# Patient Record
Sex: MALE | URBAN - METROPOLITAN AREA
[De-identification: names, ages, dates, MRNs, and addresses within clinical notes are randomized per-mention and may not be internally consistent; named-entity substitution may affect disease eponyms.]

---

## 2019-08-12 ENCOUNTER — APPOINTMENT (OUTPATIENT)
Age: 79
Setting detail: DERMATOLOGY
End: 2019-08-13

## 2019-08-12 DIAGNOSIS — D485 NEOPLASM OF UNCERTAIN BEHAVIOR OF SKIN: ICD-10-CM

## 2019-08-12 DIAGNOSIS — D18.0 HEMANGIOMA: ICD-10-CM

## 2019-08-12 DIAGNOSIS — L57.0 ACTINIC KERATOSIS: ICD-10-CM

## 2019-08-12 PROBLEM — D48.5 NEOPLASM OF UNCERTAIN BEHAVIOR OF SKIN: Status: ACTIVE | Noted: 2019-08-12

## 2019-08-12 PROBLEM — D18.01 HEMANGIOMA OF SKIN AND SUBCUTANEOUS TISSUE: Status: ACTIVE | Noted: 2019-08-12

## 2019-08-12 PROCEDURE — 17003 DESTRUCT PREMALG LES 2-14: CPT

## 2019-08-12 PROCEDURE — OTHER REASSURANCE: OTHER

## 2019-08-12 PROCEDURE — 11312 SHAVE SKIN LESION 1.1-2.0 CM: CPT

## 2019-08-12 PROCEDURE — OTHER PREMALIGNANT DESTRUCTION: OTHER

## 2019-08-12 PROCEDURE — 11311 SHAVE SKIN LESION 0.6-1.0 CM: CPT

## 2019-08-12 PROCEDURE — 99202 OFFICE O/P NEW SF 15 MIN: CPT | Mod: 25

## 2019-08-12 PROCEDURE — OTHER COUNSELING: OTHER

## 2019-08-12 PROCEDURE — 17000 DESTRUCT PREMALG LESION: CPT | Mod: 59

## 2019-08-12 PROCEDURE — 11312 SHAVE SKIN LESION 1.1-2.0 CM: CPT | Mod: 76

## 2019-08-12 PROCEDURE — OTHER SHAVE REMOVAL: OTHER

## 2019-08-12 PROCEDURE — OTHER MIPS QUALITY: OTHER

## 2019-08-12 ASSESSMENT — LOCATION SIMPLE DESCRIPTION DERM
LOCATION SIMPLE: LEFT FOREHEAD
LOCATION SIMPLE: LEFT EAR
LOCATION SIMPLE: ABDOMEN
LOCATION SIMPLE: RIGHT FOREHEAD
LOCATION SIMPLE: CHEST
LOCATION SIMPLE: SUPERIOR FOREHEAD
LOCATION SIMPLE: RIGHT CHEEK

## 2019-08-12 ASSESSMENT — LOCATION DETAILED DESCRIPTION DERM
LOCATION DETAILED: LEFT RIB CAGE
LOCATION DETAILED: RIGHT SUPERIOR LATERAL FOREHEAD
LOCATION DETAILED: RIGHT MID PREAURICULAR CHEEK
LOCATION DETAILED: LEFT POSTERIOR EAR
LOCATION DETAILED: LEFT SUPERIOR FOREHEAD
LOCATION DETAILED: RIGHT MEDIAL INFERIOR CHEST
LOCATION DETAILED: SUPERIOR MID FOREHEAD

## 2019-08-12 ASSESSMENT — LOCATION ZONE DERM
LOCATION ZONE: TRUNK
LOCATION ZONE: FACE
LOCATION ZONE: EAR

## 2019-08-12 NOTE — PROCEDURE: SHAVE REMOVAL
X Size Of Lesion In Cm (Optional): 0
Render Path Notes In Note?: No
Medical Necessity Information: It is in your best interest to select a reason for this procedure from the list below. All of these items fulfill various CMS LCD requirements except the new and changing color options.
Post-Care Instructions: I reviewed with the patient in detail post-care instructions. Patient is to keep the biopsy site dry overnight, and then apply bacitracin twice daily until healed. Patient may apply hydrogen peroxide soaks to remove any crusting.
Detail Level: Detailed
Path Notes (To The Dermatopathologist): Please check margins.
Consent was obtained from the patient. The risks and benefits to therapy were discussed in detail. Specifically, the risks of infection, scarring, bleeding, prolonged wound healing, incomplete removal, allergy to anesthesia, nerve injury and recurrence were addressed. Prior to the procedure, the treatment site was clearly identified and confirmed by the patient. All components of Universal Protocol/PAUSE Rule completed.
Hemostasis: Electrocautery
Size Of Lesion In Cm (Required): 1.5
Biopsy Method: Dermablade
Was A Bandage Applied: Yes
Billing Type: Third-Party Bill
Notification Instructions: Patient will be notified of biopsy results. However, patient instructed to call the office if not contacted within 2 weeks.
Anesthesia Type: 1% lidocaine with epinephrine
Size Of Lesion In Cm (Required): 1.3
Wound Care: Petrolatum
Size Of Lesion In Cm (Required): 0.8

## 2019-08-12 NOTE — PROCEDURE: PREMALIGNANT DESTRUCTION
Render Note In Bullet Format When Appropriate: No
Anesthesia Volume In Cc: 0.5
Post-Care Instructions: I reviewed with the patient in detail post-care instructions. Patient is to wear sunprotection, and avoid picking at any of the treated lesions. Pt may apply Vaseline to crusted or scabbing areas.
Method: electrodesiccation
Post-Procedure Care (Optional): The wound was cleaned, and a pressure dressing was applied.  The patient received detailed post-op instructions.
Detail Level: Detailed
Consent: The patient's consent was obtained including but not limited to risks of crusting, scabbing, blistering, scarring, darker or lighter pigmentary change, recurrence, incomplete removal and infection.

## 2019-08-12 NOTE — PROCEDURE: MIPS QUALITY
Quality 130: Documentation Of Current Medications In The Medical Record: Current Medications Documented
Quality 131: Pain Assessment And Follow-Up: Pain assessment using a standardized tool is documented as negative, no follow-up plan required
Quality 226: Preventive Care And Screening: Tobacco Use: Screening And Cessation Intervention: Patient screened for tobacco use and is an ex/non-smoker
Quality 137: Melanoma: Continuity Of Care - Recall System: Recall system not utilized, reason not otherwise specified
Detail Level: Detailed
Quality 431: Preventive Care And Screening: Unhealthy Alcohol Use - Screening: Patient screened for unhealthy alcohol use using a single question and scores less than 2 times per year
Quality 138: Melanoma: Coordination Of Care: Treatment plan not communicated, reason not otherwise specified.
Quality 47: Advance Care Plan: Advance Care Planning discussed and documented; advance care plan or surrogate decision maker documented in the medical record.

## 2022-11-22 ENCOUNTER — APPOINTMENT (OUTPATIENT)
Dept: URBAN - METROPOLITAN AREA CLINIC 276 | Age: 82
Setting detail: DERMATOLOGY
End: 2022-11-29

## 2022-11-22 DIAGNOSIS — L57.0 ACTINIC KERATOSIS: ICD-10-CM

## 2022-11-22 DIAGNOSIS — D22 MELANOCYTIC NEVI: ICD-10-CM

## 2022-11-22 DIAGNOSIS — L81.4 OTHER MELANIN HYPERPIGMENTATION: ICD-10-CM

## 2022-11-22 DIAGNOSIS — Z85.828 PERSONAL HISTORY OF OTHER MALIGNANT NEOPLASM OF SKIN: ICD-10-CM

## 2022-11-22 DIAGNOSIS — L82.1 OTHER SEBORRHEIC KERATOSIS: ICD-10-CM

## 2022-11-22 PROBLEM — D22.5 MELANOCYTIC NEVI OF TRUNK: Status: ACTIVE | Noted: 2022-11-22

## 2022-11-22 PROCEDURE — 17000 DESTRUCT PREMALG LESION: CPT

## 2022-11-22 PROCEDURE — OTHER MIPS QUALITY: OTHER

## 2022-11-22 PROCEDURE — OTHER COUNSELING: OTHER

## 2022-11-22 PROCEDURE — OTHER SUNSCREEN RECOMMENDATIONS: OTHER

## 2022-11-22 PROCEDURE — OTHER LIQUID NITROGEN: OTHER

## 2022-11-22 PROCEDURE — 99203 OFFICE O/P NEW LOW 30 MIN: CPT | Mod: 25

## 2022-11-22 ASSESSMENT — LOCATION ZONE DERM
LOCATION ZONE: FACE
LOCATION ZONE: ARM
LOCATION ZONE: TRUNK

## 2022-11-22 ASSESSMENT — LOCATION DETAILED DESCRIPTION DERM
LOCATION DETAILED: LEFT MID-UPPER BACK
LOCATION DETAILED: LEFT SUPERIOR UPPER BACK
LOCATION DETAILED: RIGHT SUPERIOR LATERAL FOREHEAD
LOCATION DETAILED: RIGHT POSTERIOR SHOULDER
LOCATION DETAILED: RIGHT SUPERIOR UPPER BACK

## 2022-11-22 ASSESSMENT — LOCATION SIMPLE DESCRIPTION DERM
LOCATION SIMPLE: RIGHT FOREHEAD
LOCATION SIMPLE: RIGHT UPPER BACK
LOCATION SIMPLE: LEFT UPPER BACK
LOCATION SIMPLE: RIGHT SHOULDER

## 2022-11-22 NOTE — PROCEDURE: LIQUID NITROGEN
Render Note In Bullet Format When Appropriate: No
Number Of Freeze-Thaw Cycles: 2 freeze-thaw cycles
Render Post-Care Instructions In Note?: yes
Post-Care Instructions: I reviewed with the patient in detail post-care instructions. Patient is to wear sunprotection, and avoid picking at any of the treated lesions. Pt may apply Vaseline to crusted or scabbing areas.
Detail Level: Simple
Consent: The patient's consent was obtained including but not limited to risks of crusting, scabbing, blistering, scarring, darker or lighter pigmentary change, recurrence, incomplete removal and infection.

## 2022-12-11 ENCOUNTER — APPOINTMENT (OUTPATIENT)
Dept: GENERAL RADIOLOGY | Age: 82
End: 2022-12-11
Attending: EMERGENCY MEDICINE
Payer: MEDICARE

## 2022-12-11 ENCOUNTER — HOSPITAL ENCOUNTER (EMERGENCY)
Age: 82
Discharge: HOME | End: 2022-12-11
Attending: EMERGENCY MEDICINE
Payer: MEDICARE

## 2022-12-11 VITALS
SYSTOLIC BLOOD PRESSURE: 131 MMHG | TEMPERATURE: 98.1 F | DIASTOLIC BLOOD PRESSURE: 69 MMHG | RESPIRATION RATE: 16 BRPM | HEART RATE: 61 BPM | OXYGEN SATURATION: 98 %

## 2022-12-11 DIAGNOSIS — M25.552 ACUTE HIP PAIN, LEFT: Primary | ICD-10-CM

## 2022-12-11 PROCEDURE — 73502 X-RAY EXAM HIP UNI 2-3 VIEWS: CPT

## 2022-12-11 PROCEDURE — 99283 EMERGENCY DEPT VISIT LOW MDM: CPT

## 2022-12-11 RX ORDER — ACETAMINOPHEN 500 MG
1000 TABLET ORAL
Qty: 20 TABLET | Refills: 0 | Status: SHIPPED | OUTPATIENT
Start: 2022-12-11

## 2022-12-11 RX ORDER — DICLOFENAC SODIUM 10 MG/G
4 GEL TOPICAL 4 TIMES DAILY
Qty: 100 G | Refills: 0 | Status: SHIPPED | OUTPATIENT
Start: 2022-12-11

## 2022-12-11 RX ORDER — LIDOCAINE 4 G/100G
PATCH TOPICAL
Qty: 20 PATCH | Refills: 0 | Status: SHIPPED | OUTPATIENT
Start: 2022-12-11

## 2022-12-11 NOTE — ED NOTES
MD reviewed discharge instructions and options with patient and patient verbalized understanding. RN reviewed discharge instructions using teachback method. Pt ambulated to exit without difficulty and in no signs of acute distress escorted by his daughter, and she  will drive home. No complaints or needs expressed at this time. Patient was counseled on medications prescribed at discharge. VSS, verbalized relief from most intense pain. Patient to call his PCP in the morning for appointment.

## 2022-12-11 NOTE — ED TRIAGE NOTES
Pt arrives with cc of left hip pain that has been going on since last night. Pt normally lives at a memory care facility but the daughter pulled him out of it for safety concerns. Pt sustained fall on right hip on 11/28 and had normal xrays. Daughter is concerned he might have had another fall that the nursing facility did not report.

## 2022-12-16 NOTE — ED PROVIDER NOTES
The history is provided by a relative. Hip Pain   This is a new problem. The current episode started yesterday. The problem occurs constantly. The problem has not changed since onset. The pain is present in the left upper leg and left hip. The pain is moderate. Pertinent negatives include full range of motion. The symptoms are aggravated by movement and standing. He has tried nothing for the symptoms. There has been no history of extremity trauma. No past medical history on file. No past surgical history on file. No family history on file. Social History     Socioeconomic History    Marital status:      Spouse name: Not on file    Number of children: Not on file    Years of education: Not on file    Highest education level: Not on file   Occupational History    Not on file   Tobacco Use    Smoking status: Not on file    Smokeless tobacco: Not on file   Substance and Sexual Activity    Alcohol use: Not on file    Drug use: Not on file    Sexual activity: Not on file   Other Topics Concern    Not on file   Social History Narrative    Not on file     Social Determinants of Health     Financial Resource Strain: Not on file   Food Insecurity: Not on file   Transportation Needs: Not on file   Physical Activity: Not on file   Stress: Not on file   Social Connections: Not on file   Intimate Partner Violence: Not on file   Housing Stability: Not on file         ALLERGIES: Patient has no known allergies. Review of Systems   All other systems reviewed and are negative. Vitals:    12/11/22 0533   BP: 131/69   Pulse: 61   Resp: 16   Temp: 98.1 °F (36.7 °C)   SpO2: 98%            Physical Exam  Vitals and nursing note reviewed. Constitutional:       General: He is not in acute distress. Appearance: He is well-developed. HENT:      Head: Normocephalic and atraumatic. Eyes:      Conjunctiva/sclera: Conjunctivae normal.   Neck:      Trachea: No tracheal deviation.    Cardiovascular:      Rate and Rhythm: Normal rate and regular rhythm. Pulmonary:      Effort: Pulmonary effort is normal. No respiratory distress. Abdominal:      General: There is no distension. Musculoskeletal:         General: No tenderness, deformity or signs of injury. Normal range of motion. Cervical back: Neck supple. Right lower leg: No edema. Left lower leg: No edema. Skin:     General: Skin is warm and dry. Neurological:      Mental Status: He is alert. Cranial Nerves: No cranial nerve deficit. Psychiatric:         Behavior: Behavior normal.        MDM       [de-identified] y.o. male presents with atraumatic left hip pain, noted by daughter who recently brought him home from memory care unit and he has been complaining of it. No signs of injury. Plan film negative. Provided instructions for supportive care measures. Plan to follow up with PCP as needed and return precautions discussed for worsening or new concerning symptoms.      Procedures

## 2023-01-02 ENCOUNTER — APPOINTMENT (OUTPATIENT)
Dept: CT IMAGING | Age: 83
End: 2023-01-02
Attending: EMERGENCY MEDICINE
Payer: MEDICARE

## 2023-01-02 ENCOUNTER — HOSPITAL ENCOUNTER (EMERGENCY)
Age: 83
Discharge: HOME | End: 2023-01-02
Attending: EMERGENCY MEDICINE
Payer: MEDICARE

## 2023-01-02 VITALS
BODY MASS INDEX: 26.04 KG/M2 | WEIGHT: 181.88 LBS | HEART RATE: 64 BPM | TEMPERATURE: 97.8 F | DIASTOLIC BLOOD PRESSURE: 57 MMHG | OXYGEN SATURATION: 98 % | SYSTOLIC BLOOD PRESSURE: 96 MMHG | RESPIRATION RATE: 16 BRPM | HEIGHT: 70 IN

## 2023-01-02 DIAGNOSIS — T07.XXXA ABRASIONS OF MULTIPLE SITES: ICD-10-CM

## 2023-01-02 DIAGNOSIS — S09.90XA INJURY OF HEAD, INITIAL ENCOUNTER: Primary | ICD-10-CM

## 2023-01-02 PROCEDURE — 99284 EMERGENCY DEPT VISIT MOD MDM: CPT

## 2023-01-02 PROCEDURE — 70450 CT HEAD/BRAIN W/O DYE: CPT

## 2023-01-02 NOTE — ED TRIAGE NOTES
Patient had fall yesterday at the facility. Per care takes patient was aggressive, grabbed on the t-shirt, pulled and fall. Patient had physical therapy today, reported dizziness, marks on his head and hand/back, knee noted. Patient is ambulatory in triage. Patient reports left foot toe pain, left/right ear pain. Patient had Hx of Alzheimer disease, takes blood thinner medication. Patient is A/O to self at baseline.

## 2023-01-02 NOTE — ED NOTES
Discharge instructions reviewed with pt and copy given along with RX by this RN. Patient educated on follow up with PCP and is ambulatory from ED in no sign of distress or discomfort accompanied by daughter who is providing transport home.

## 2023-01-02 NOTE — ED PROVIDER NOTES
27-year-old male with a past medical history significant for Alzheimer dementia and chronic anticoagulation with Eliquis who presents to the ER for evaluation after a ground-level fall 2 days ago with a complaint injury malaise and dizziness. The patient sustained multiple superficial abrasions all over his body and ambulate with assistance. Daughter states that the patient was about to undergo physical therapy when he stated that he was not feeling well and was instructed to go to the ER for further evaluation. Past Medical History:   Diagnosis Date    Alzheimer disease ([facility] [address]     Dementia ([facility] [address]        No past surgical history on file. No family history on file. Social History     Socioeconomic History    Marital status:      Spouse name: Not on file    Number of children: Not on file    Years of education: Not on file    Highest education level: Not on file   Occupational History    Not on file   Tobacco Use    Smoking status: Not on file    Smokeless tobacco: Not on file   Substance and Sexual Activity    Alcohol use: Not on file    Drug use: Not on file    Sexual activity: Not on file   Other Topics Concern    Not on file   Social History Narrative    Not on file     Social Determinants of Health     Financial Resource Strain: Not on file   Food Insecurity: Not on file   Transportation Needs: Not on file   Physical Activity: Not on file   Stress: Not on file   Social Connections: Not on file   Intimate Partner Violence: Not on file   Housing Stability: Not on file         ALLERGIES: Patient has no known allergies. Review of Systems   All other systems reviewed and are negative. Vitals:    01/02/23 1208 01/02/23 1214   BP: (!) 96/57    Pulse: 64    Resp: 16    Temp: 97.8 °F (36.6 °C)    SpO2: 98% 98%   Weight: 82.5 kg (181 lb 14.1 oz)    Height: 5' 10\" (1.778 m)             Physical Exam  Vitals and nursing note reviewed. Exam conducted with a chaperone present.         CONSTITUTIONAL: Frail elderly male; in no apparent distress  HEAD: Normocephalic; atraumatic  EYES: PERRL; EOM intact; conjunctiva and sclera are clear bilaterally. ENT: No rhinorrhea; normal pharynx with no tonsillar hypertrophy; mucous membranes pink/moist, no erythema, no exudate. NECK: Supple; non-tender; no cervical lymphadenopathy  CARD: Normal S1, S2; no murmurs, rubs, or gallops. Regular rate and rhythm. RESP: Normal respiratory effort; breath sounds clear and equal bilaterally; no wheezes, rhonchi, or rales. ABD: Normal bowel sounds; non-distended; non-tender; no palpable organomegaly, no masses, no bruits. Back Exam: Normal inspection; no vertebral point tenderness, no CVA tenderness. Normal range of motion. EXT: Normal ROM in all four extremities; non-tender to palpation; no swelling or deformity; distal pulses are normal, no edema. SKIN: Warm; dry; multiple superficial abrasions to the forehead, right shoulder, right hand and knee  NEURO:Alert and oriented x 3, coherent, SANTY-XII grossly intact, sensory and motor are non-focal.        MDM  Number of Diagnoses or Management Options  Abrasions of multiple sites  Injury of head, initial encounter  Diagnosis management comments: Assessment: 29-year-old male with history of chronic anticoagulation and dementia who had a unwitnessed ground-level fall due to gait instability presents to the ER for evaluation for same. He remained hemodynamically stable with a nonfocal exam.  Patient is a very limited historian as well as the history was obtained from the daughter at the bedside. The patient multiple superficial injuries but will need evaluation for ICH because of symptom and dizziness and unsteady gait. Plan: CT scan of the head/education, reassurance, symptomatic treatment/serial exam/ Monitor and Reevaluate.          Amount and/or Complexity of Data Reviewed  Clinical lab tests: ordered and reviewed  Tests in the radiology section of CPT®: ordered and reviewed  Tests in the medicine section of CPT®: reviewed and ordered  Discussion of test results with the performing providers: yes  Decide to obtain previous medical records or to obtain history from someone other than the patient: yes  Obtain history from someone other than the patient: yes  Review and summarize past medical records: yes  Discuss the patient with other providers: yes  Independent visualization of images, tracings, or specimens: yes           Procedures    Progress Note:   Pt has been reexamined by Stuart Guy MD. Pt is feeling much better. Symptoms have improved. All available results have been reviewed with pt and any available family. Pt understands sx, dx, and tx in ED. Care plan has been outlined and questions have been answered. Pt is ready to go home. Will send home on Head injury/ abrasions iunstructions. Outpatient referral with PCP as needed. Written by Stuart Guy MD,12:59 PM    .   .

## 2023-01-31 ENCOUNTER — APPOINTMENT (OUTPATIENT)
Dept: CT IMAGING | Age: 83
End: 2023-01-31
Attending: EMERGENCY MEDICINE
Payer: MEDICARE

## 2023-01-31 ENCOUNTER — HOSPITAL ENCOUNTER (EMERGENCY)
Age: 83
Discharge: HOME | End: 2023-01-31
Attending: EMERGENCY MEDICINE
Payer: MEDICARE

## 2023-01-31 VITALS
SYSTOLIC BLOOD PRESSURE: 132 MMHG | HEART RATE: 76 BPM | RESPIRATION RATE: 16 BRPM | TEMPERATURE: 97.8 F | DIASTOLIC BLOOD PRESSURE: 71 MMHG | BODY MASS INDEX: 26.57 KG/M2 | WEIGHT: 185.63 LBS | HEIGHT: 70 IN | OXYGEN SATURATION: 99 %

## 2023-01-31 DIAGNOSIS — S00.81XA FACIAL ABRASION, INITIAL ENCOUNTER: Primary | ICD-10-CM

## 2023-01-31 PROCEDURE — 72125 CT NECK SPINE W/O DYE: CPT

## 2023-01-31 PROCEDURE — 99284 EMERGENCY DEPT VISIT MOD MDM: CPT

## 2023-01-31 PROCEDURE — 70486 CT MAXILLOFACIAL W/O DYE: CPT

## 2023-01-31 PROCEDURE — 70450 CT HEAD/BRAIN W/O DYE: CPT

## 2023-01-31 RX ORDER — MAG HYDROX/ALUMINUM HYD/SIMETH 400-400-40
SUSPENSION, ORAL (FINAL DOSE FORM) ORAL
COMMUNITY

## 2023-01-31 RX ORDER — ATORVASTATIN CALCIUM 10 MG/1
TABLET, FILM COATED ORAL DAILY
COMMUNITY

## 2023-01-31 RX ORDER — DONEPEZIL HYDROCHLORIDE 5 MG/1
TABLET, FILM COATED ORAL
COMMUNITY

## 2023-01-31 RX ORDER — LISINOPRIL 10 MG/1
TABLET ORAL DAILY
COMMUNITY

## 2023-01-31 RX ORDER — IBUPROFEN 800 MG/1
TABLET ORAL
COMMUNITY

## 2023-01-31 RX ORDER — QUETIAPINE FUMARATE 50 MG/1
50 TABLET, FILM COATED ORAL 2 TIMES DAILY
COMMUNITY

## 2023-01-31 RX ORDER — METOPROLOL SUCCINATE 25 MG/1
TABLET, EXTENDED RELEASE ORAL DAILY
COMMUNITY

## 2023-01-31 RX ORDER — FUROSEMIDE 40 MG/1
TABLET ORAL DAILY
COMMUNITY

## 2023-01-31 NOTE — ED NOTES
I have reviewed discharge instructions with the daughter. The daughter verbalized understanding. Copy of discharge instructions provided.  List of [facility] primary care providers given to daughter per her request.

## 2023-01-31 NOTE — DISCHARGE INSTRUCTIONS
Thank you for allowing us to provide you with medical care today. We realize that you have many choices for your emergency care needs. We thank you for choosing [facility]. Please choose us in the future for any continued health care needs. The exam and treatment you received in the Emergency Department were for an emergent problem and are not intended as complete care. It is important that you follow up with a doctor, nurse practitioner, or physician's assistant for ongoing care. If your symptoms worsen or you do not improve as expected and you are unable to reach your usual health care provider, you should return to the Emergency Department. We are available 24 hours a day. Please make an appointment with your health care provider(s) for follow up of your Emergency Department visit. Take this sheet with you when you go to your follow-up visit.

## 2023-01-31 NOTE — ED PROVIDER NOTES
82-year-old male with a history of dementia presents with a chief complaint of a fall. Patient's family member reports that she received a phone call this morning stating that the patient had fallen and broken his glasses. He did suffer some abrasions to his face. The fall was unwitnessed. Patient complains of no pain. Past Medical History:   Diagnosis Date    Alzheimer disease ([facility] [address]     Dementia ([facility] [address]        No past surgical history on file. No family history on file. Social History     Socioeconomic History    Marital status:      Spouse name: Not on file    Number of children: Not on file    Years of education: Not on file    Highest education level: Not on file   Occupational History    Not on file   Tobacco Use    Smoking status: Not on file    Smokeless tobacco: Not on file   Substance and Sexual Activity    Alcohol use: Not on file    Drug use: Not on file    Sexual activity: Not on file   Other Topics Concern    Not on file   Social History Narrative    Not on file     Social Determinants of Health     Financial Resource Strain: Not on file   Food Insecurity: Not on file   Transportation Needs: Not on file   Physical Activity: Not on file   Stress: Not on file   Social Connections: Not on file   Intimate Partner Violence: Not on file   Housing Stability: Not on file         ALLERGIES: Patient has no known allergies. Review of Systems   Unable to perform ROS: Dementia     There were no vitals filed for this visit. Physical Exam  Vitals and nursing note reviewed. Constitutional:       General: He is not in acute distress. Appearance: Normal appearance. He is not ill-appearing, toxic-appearing or diaphoretic. HENT:      Head: Normocephalic. Comments: Minor superficial abrasions to the face. Eyes:      Extraocular Movements: Extraocular movements intact. Cardiovascular:      Rate and Rhythm: Normal rate.    Pulmonary:      Effort: Pulmonary effort is normal. No respiratory distress. Abdominal:      General: There is no distension. Musculoskeletal:         General: Normal range of motion. Cervical back: Normal range of motion. Skin:     General: Skin is dry. Capillary Refill: Capillary refill takes less than 2 seconds. Neurological:      General: No focal deficit present. Mental Status: He is alert. Mental status is at baseline. Psychiatric:         Mood and Affect: Mood normal.        Medical Decision Making      Patient presents after a fall. He does have some minor abrasions to the face. CT of the head, C-spine and max face obtained to rule out traumatic injury. I have independently reviewed the obtained radiographic images and note no intracranial hemorrhage on CT head. Will await formal radiology read. Radiology confirms no traumatic injury on CT head, C-spine and max face. Discussed my clinical impression(s), any labs and/or radiology results with the patient's family member. I answered any questions and addressed any concerns. Discussed the importance of following up with their primary care physician and/or specialist(s). Discussed signs or symptoms that would warrant return back to the ER for further evaluation. The patient's family member is agreeable with discharge. Amount and/or Complexity of Data Reviewed  Radiology: ordered.            Procedures

## 2023-01-31 NOTE — ED TRIAGE NOTES
Daughter reports that patient is a resident at [facility] and staff reported that noticed patient fell at unknown time last night.  Pt has dried blood and abrasion noted to L side of face and broken glasses when daughter came to see patient this am.

## 2023-03-23 ENCOUNTER — OFFICE VISIT (OUTPATIENT)
Dept: PRIMARY CARE CLINIC | Age: 83
End: 2023-03-23

## 2023-03-23 VITALS
WEIGHT: 168 LBS | HEIGHT: 70 IN | TEMPERATURE: 97.8 F | DIASTOLIC BLOOD PRESSURE: 74 MMHG | OXYGEN SATURATION: 99 % | HEART RATE: 55 BPM | RESPIRATION RATE: 18 BRPM | SYSTOLIC BLOOD PRESSURE: 111 MMHG | BODY MASS INDEX: 24.05 KG/M2

## 2023-03-23 DIAGNOSIS — E55.9 VITAMIN D DEFICIENCY: ICD-10-CM

## 2023-03-23 DIAGNOSIS — R63.4 WEIGHT LOSS: ICD-10-CM

## 2023-03-23 DIAGNOSIS — I10 PRIMARY HYPERTENSION: Primary | ICD-10-CM

## 2023-03-23 DIAGNOSIS — E83.42 HYPOMAGNESEMIA: ICD-10-CM

## 2023-03-23 DIAGNOSIS — E78.00 HYPERCHOLESTEREMIA: ICD-10-CM

## 2023-03-23 DIAGNOSIS — I48.91 ATRIAL FIBRILLATION, UNSPECIFIED TYPE (HCC): ICD-10-CM

## 2023-03-23 DIAGNOSIS — G25.81 RESTLESS LEG SYNDROME: ICD-10-CM

## 2023-03-23 DIAGNOSIS — F03.911 DEMENTIA WITH AGITATION, UNSPECIFIED DEMENTIA SEVERITY, UNSPECIFIED DEMENTIA TYPE (HCC): ICD-10-CM

## 2023-03-23 DIAGNOSIS — Z97.4 HEARING AID WORN: ICD-10-CM

## 2023-03-23 DIAGNOSIS — R30.0 DYSURIA: ICD-10-CM

## 2023-03-23 DIAGNOSIS — C44.90 SKIN CANCER: ICD-10-CM

## 2023-03-23 DIAGNOSIS — E53.8 B12 DEFICIENCY: ICD-10-CM

## 2023-03-23 NOTE — PROGRESS NOTES
Chief Complaint   Patient presents with    \[facility]\"" Care     Lives in memory care  Dementia care       There were no vitals taken for this visit. 1. Have you been to the ER, urgent care clinic since your last visit? Hospitalized since your last visit? No    2. Have you seen or consulted any other health care providers outside of the [address] [place] [address] since your last visit? Include any pap smears or colon screening. No      3. For patients aged 39-70: Has the patient had a colonoscopy / FIT/ Cologuard?  Yes - no Care Gap present

## 2023-03-23 NOTE — PROGRESS NOTES
Pretty Bach is a 82 y.o.  male and presents with     Chief Complaint   Patient presents with    Establish Care     Lives in memory [facility]  Dementia care     Pt is here to establish care. Accompanied by daughter and son  Pt lives in Memory care. Was seeing Neurologist in [address] [place] [address]. HAS appt with Dr Tana North . Pt has h/o agitation in the past.  There is a physician at the memory [facility]. Daughter also contacted Hospice. Pt has h/o insomnia  Has restless leg syndrome. Daughter wants to try Gabapentin . Pt has neuropathy . Was diagnosed with Dementia in 2006. Initially was diagnosed with vascualr dementia  Dr Bren Modi year thought she could have Lewy body as he was shalng   Forgets to eat , swallow  Has lost 17 pounds since January 31st.  Daughter met with Psych. Pt is on Seroquel. Dose was increased - had side effects. HAs extended release. Daughter lives 2 miles away. Epson salt helps him calm down. Magnesium supplement seems to help'  Lthea 9 seems to help with anxiety. Pt  falls. Has appt with Dermtaology on forehead. Dr Parish Blackman prescribes the meds. Pt has afib. Pt was admitted last year to [facility]. Past Medical History:   Diagnosis Date    Alzheimer disease ([facility]     Dementia ([facility]      No past surgical history on file. Current Outpatient Medications   Medication Sig    atorvastatin (LIPITOR) 10 mg tablet Take  by mouth daily. donepeziL (ARICEPT) 5 mg tablet Take  by mouth nightly. lisinopriL (PRINIVIL, ZESTRIL) 10 mg tablet Take  by mouth daily. metoprolol succinate (TOPROL-XL) 25 mg XL tablet Take  by mouth daily. QUEtiapine (SEROquel) 50 mg tablet Take 50 mg by mouth two (2) times a day. saw palmetto 450 mg cap Take  by mouth.    rivaroxaban (XARELTO) 20 mg tab tablet Take  by mouth daily. ibuprofen (MOTRIN) 800 mg tablet Take  by mouth.     acetaminophen (TYLENOL) 500 mg tablet Take 2 Tablets by mouth every six (6) hours as needed for Pain or Fever. lidocaine (Lido Rafael) 4 % patch Apply as needed every 12 hours for pain. diclofenac (Voltaren) 1 % gel Apply 4 g to affected area four (4) times daily. No current facility-administered medications for this visit. Health Maintenance   Topic Date Due    COVID-19 Vaccine (1) Never done    Lipid Screen  Never done    DTaP/Tdap/Td series (1 - Tdap) Never done    Shingles Vaccine (1 of 2) Never done    Pneumococcal 65+ years (1 - PCV) Never done    Flu Vaccine (1) Never done    Medicare Yearly Exam  Never done    Depression Screen  03/23/2024       There is no immunization history on file for this patient. No LMP for male patient. Allergies and Intolerances:   No Known Allergies    Family History:   No family history on file. Social History:   He  reports that he has never smoked. He has never used smokeless tobacco.  He  reports that he does not currently use alcohol.             Review of Systems:   General: negative for - chills, fatigue, fever, weight change  Psych: negative for - anxiety, depression, irritability or mood swings  ENT: negative for - headaches, hearing change, nasal congestion, oral lesions, sneezing or sore throat  Heme/ Lymph: negative for - bleeding problems, bruising, pallor or swollen lymph nodes  Endo: negative for - hot flashes, polydipsia/polyuria or temperature intolerance  Resp: negative for - cough, shortness of breath or wheezing  CV: negative for - chest pain, edema or palpitations  GI: negative for - abdominal pain, change in bowel habits, constipation, diarrhea or nausea/vomiting  : negative for - dysuria, hematuria, incontinence, pelvic pain or vulvar/vaginal symptoms  MSK: negative for - joint pain, joint swelling or muscle pain  Neuro: negative for - confusion, headaches, seizures or weakness  Derm: negative for - dry skin, hair changes, rash or skin lesion changes          Physical:   Vitals:   Vitals:    03/23/23 1304   BP: 111/74   Pulse: (!) 55 Resp: 18   Temp: 97.8 °F (36.6 °C)   TempSrc: Temporal   SpO2: 99%   Weight: 168 lb (76.2 kg)   Height: 5' 10\" (1.778 m)           Exam:   HEENT- atraumatic,normocephalic, awake, oriented, well nourished  Neck - supple,no enlarged lymph nodes, no JVD, no thyromegaly  Chest- CTA, no rhonchi, no crackles  Heart- rrr, no murmurs / gallop/rub  Abdomen- soft,BS+,NT, no hepatosplenomegaly  Ext - no c/c/edema   Neuro- no focal deficits. Power 5/5 all extremities, patient has severe dementia, was unable to give any history, or answer any questions,  Skin - warm,dry, no obvious rashes. Review of Data:   LABS:   No results found for: WBC, HGB, HCT, PLT, HGBEXT, HCTEXT, PLTEXT, HGBEXT, HCTEXT, PLTEXT  No results found for: NA, K, CL, CO2, GLU, BUN, CREA  No results found for: CHOL, CHOLX, CHLST, CHOLV, HDL, HDLP, LDL, LDLC, DLDLP, TGLX, TRIGL, TRIGP  No components found for: GPT        Impression / Plan:        ICD-10-CM ICD-9-CM    1. Primary hypertension  I10 401.9 CBC WITH AUTOMATED DIFF      METABOLIC PANEL, COMPREHENSIVE      TSH 3RD GENERATION      URINALYSIS W/ RFLX MICROSCOPIC      2. Hypercholesteremia  H94.55 578.6 METABOLIC PANEL, COMPREHENSIVE      3. Dementia with agitation, unspecified dementia severity, unspecified dementia type  F03.911 294.21       4. Skin cancer  C44.90 173.90       5. Atrial fibrillation, unspecified type ([facility]  I48.91 427.31       6. Restless leg syndrome  G25.81 333.94       7. Weight loss  R63.4 783.21       8. B12 deficiency  E53.8 266.2 VITAMIN B12 & FOLATE      9. Vitamin D deficiency  E55.9 268.9 VITAMIN D, 25 HYDROXY      10. Hearing aid worn  Z97.4 V45.89       11. Hypomagnesemia  E83.42 275.2 MAGNESIUM      12. Dysuria  R30.0 788. 1 CULTURE, URINE        Spent over 45 minutes with patient and family members. Addressed concern regarding agitation as well as hospice care      Explained to patient risk benefits of the medications. Advised patient to stop meds if having any side effects. Pt verbalized understanding of the instructions. I have discussed the diagnosis with the patient and the intended plan as seen in the above orders. The patient has received an after-visit summary and questions were answered concerning future plans. I have discussed medication side effects and warnings with the patient as well. I have reviewed the plan of care with the patient, accepted their input and they are in agreement with the treatment goals. Reviewed plan of care. Patient has provided input and agrees with goals. Follow-up and Dispositions    Return in about 3 months (around 6/23/2023).          Kavon Moe MD

## 2023-03-26 ENCOUNTER — APPOINTMENT (OUTPATIENT)
Dept: CT IMAGING | Age: 83
DRG: 057 | End: 2023-03-26
Attending: STUDENT IN AN ORGANIZED HEALTH CARE EDUCATION/TRAINING PROGRAM
Payer: MEDICARE

## 2023-03-26 ENCOUNTER — APPOINTMENT (OUTPATIENT)
Dept: GENERAL RADIOLOGY | Age: 83
DRG: 057 | End: 2023-03-26
Attending: STUDENT IN AN ORGANIZED HEALTH CARE EDUCATION/TRAINING PROGRAM
Payer: MEDICARE

## 2023-03-26 ENCOUNTER — HOSPITAL ENCOUNTER (INPATIENT)
Age: 83
LOS: 1 days | Discharge: NURSING, REHAB OR LONG-TERM CARE | DRG: 057 | End: 2023-03-27
Attending: STUDENT IN AN ORGANIZED HEALTH CARE EDUCATION/TRAINING PROGRAM | Admitting: FAMILY MEDICINE
Payer: MEDICARE

## 2023-03-26 DIAGNOSIS — R45.1 AGITATION: ICD-10-CM

## 2023-03-26 DIAGNOSIS — E55.9 VITAMIN D DEFICIENCY: Primary | ICD-10-CM

## 2023-03-26 DIAGNOSIS — R07.9 CHEST PAIN, UNSPECIFIED TYPE: Primary | ICD-10-CM

## 2023-03-26 DIAGNOSIS — R00.1 BRADYCARDIA: ICD-10-CM

## 2023-03-26 DIAGNOSIS — R41.0 CONFUSION: ICD-10-CM

## 2023-03-26 LAB
ALBUMIN SERPL-MCNC: 3.9 G/DL (ref 3.5–5)
ALBUMIN/GLOB SERPL: 1.3 (ref 1.1–2.2)
ALP SERPL-CCNC: 107 U/L (ref 45–117)
ALT SERPL-CCNC: 24 U/L (ref 12–78)
ANION GAP SERPL CALC-SCNC: 8 MMOL/L (ref 5–15)
AST SERPL-CCNC: 17 U/L (ref 15–37)
BASOPHILS # BLD: 0.1 K/UL (ref 0–0.1)
BASOPHILS NFR BLD: 1 % (ref 0–1)
BILIRUB SERPL-MCNC: 1.1 MG/DL (ref 0.2–1)
BNP SERPL-MCNC: 173 PG/ML (ref 0–450)
BUN SERPL-MCNC: 24 MG/DL (ref 6–20)
BUN/CREAT SERPL: 29 (ref 12–20)
CALCIUM SERPL-MCNC: 8.8 MG/DL (ref 8.5–10.1)
CHLORIDE SERPL-SCNC: 106 MMOL/L (ref 97–108)
CO2 SERPL-SCNC: 31 MMOL/L (ref 21–32)
CREAT SERPL-MCNC: 0.84 MG/DL (ref 0.7–1.3)
DIFFERENTIAL METHOD BLD: NORMAL
EOSINOPHIL # BLD: 0.3 K/UL (ref 0–0.4)
EOSINOPHIL NFR BLD: 5 % (ref 0–7)
ERYTHROCYTE [DISTWIDTH] IN BLOOD BY AUTOMATED COUNT: 12.1 % (ref 11.5–14.5)
GLOBULIN SER CALC-MCNC: 3 G/DL (ref 2–4)
GLUCOSE SERPL-MCNC: 88 MG/DL (ref 65–100)
HCT VFR BLD AUTO: 43 % (ref 36.6–50.3)
HGB BLD-MCNC: 14.2 G/DL (ref 12.1–17)
IMM GRANULOCYTES # BLD AUTO: 0 K/UL (ref 0–0.04)
IMM GRANULOCYTES NFR BLD AUTO: 0 % (ref 0–0.5)
LYMPHOCYTES # BLD: 1.8 K/UL (ref 0.8–3.5)
LYMPHOCYTES NFR BLD: 30 % (ref 12–49)
MAGNESIUM SERPL-MCNC: 2.6 MG/DL (ref 1.6–2.4)
MCH RBC QN AUTO: 30.1 PG (ref 26–34)
MCHC RBC AUTO-ENTMCNC: 33 G/DL (ref 30–36.5)
MCV RBC AUTO: 91.3 FL (ref 80–99)
MONOCYTES # BLD: 0.5 K/UL (ref 0–1)
MONOCYTES NFR BLD: 9 % (ref 5–13)
NEUTS SEG # BLD: 3.3 K/UL (ref 1.8–8)
NEUTS SEG NFR BLD: 55 % (ref 32–75)
NRBC # BLD: 0 K/UL (ref 0–0.01)
NRBC BLD-RTO: 0 PER 100 WBC
PLATELET # BLD AUTO: 159 K/UL (ref 150–400)
PMV BLD AUTO: 11.5 FL (ref 8.9–12.9)
POTASSIUM SERPL-SCNC: 3.8 MMOL/L (ref 3.5–5.1)
PROT SERPL-MCNC: 6.9 G/DL (ref 6.4–8.2)
RBC # BLD AUTO: 4.71 M/UL (ref 4.1–5.7)
SODIUM SERPL-SCNC: 145 MMOL/L (ref 136–145)
TROPONIN I SERPL HS-MCNC: 8 NG/L (ref 0–76)
TROPONIN I SERPL HS-MCNC: 9 NG/L (ref 0–76)
TSH SERPL DL<=0.05 MIU/L-ACNC: 1.8 UIU/ML (ref 0.36–3.74)
WBC # BLD AUTO: 6 K/UL (ref 4.1–11.1)

## 2023-03-26 PROCEDURE — 83735 ASSAY OF MAGNESIUM: CPT

## 2023-03-26 PROCEDURE — 74011250636 HC RX REV CODE- 250/636: Performed by: STUDENT IN AN ORGANIZED HEALTH CARE EDUCATION/TRAINING PROGRAM

## 2023-03-26 PROCEDURE — 84484 ASSAY OF TROPONIN QUANT: CPT

## 2023-03-26 PROCEDURE — 70450 CT HEAD/BRAIN W/O DYE: CPT

## 2023-03-26 PROCEDURE — 85025 COMPLETE CBC W/AUTO DIFF WBC: CPT

## 2023-03-26 PROCEDURE — 99285 EMERGENCY DEPT VISIT HI MDM: CPT

## 2023-03-26 PROCEDURE — 96374 THER/PROPH/DIAG INJ IV PUSH: CPT

## 2023-03-26 PROCEDURE — 83880 ASSAY OF NATRIURETIC PEPTIDE: CPT

## 2023-03-26 PROCEDURE — 65270000046 HC RM TELEMETRY

## 2023-03-26 PROCEDURE — 93005 ELECTROCARDIOGRAM TRACING: CPT

## 2023-03-26 PROCEDURE — 84443 ASSAY THYROID STIM HORMONE: CPT

## 2023-03-26 PROCEDURE — 36415 COLL VENOUS BLD VENIPUNCTURE: CPT

## 2023-03-26 PROCEDURE — 71045 X-RAY EXAM CHEST 1 VIEW: CPT

## 2023-03-26 PROCEDURE — 80053 COMPREHEN METABOLIC PANEL: CPT

## 2023-03-26 RX ORDER — ERGOCALCIFEROL 1.25 MG/1
50000 CAPSULE ORAL
Qty: 12 CAPSULE | Refills: 1 | Status: SHIPPED | OUTPATIENT
Start: 2023-03-26

## 2023-03-26 RX ORDER — ATROPINE SULFATE 0.1 MG/ML
1 INJECTION INTRAVENOUS
Status: COMPLETED | OUTPATIENT
Start: 2023-03-26 | End: 2023-03-26

## 2023-03-26 RX ADMIN — ATROPINE SULFATE 1 MG: 0.1 INJECTION, SOLUTION ENDOTRACHEAL; INTRAMUSCULAR; INTRAVENOUS; SUBCUTANEOUS at 20:29

## 2023-03-26 NOTE — ED PROVIDER NOTES
51-year-old male presents for altered mental status with his daughter. Patient has a history of dementia and psychiatric medication use. Patient lives in a nursing home dementia unit and was reported to be agitated hitting people and acting confused and not his normal self. They called his daughter who is present currently. She reports that she tried to have him lie down and he had reported having some chest pain and weakness. No known falls. They checked the patient's vital signs and reported heart rate in the 40s. Patient has a past medical history of atrial fibrillation, Alzheimer's, dementia, thrombocytopenia, anxiety hyperlipidemia hypercholesterolemia per records from [facility] admission 2016. Altered mental status   Associated symptoms include confusion and agitation. Functional status baseline:  [EPIC#1537^NOTE}      Past Medical History:   Diagnosis Date    Alzheimer disease ([facility]     Dementia ([facility]        History reviewed. No pertinent surgical history. History reviewed. No pertinent family history.     Social History     Socioeconomic History    Marital status:      Spouse name: Not on file    Number of children: Not on file    Years of education: Not on file    Highest education level: Not on file   Occupational History    Not on file   Tobacco Use    Smoking status: Never     Passive exposure: Never    Smokeless tobacco: Never   Vaping Use    Vaping Use: Never used   Substance and Sexual Activity    Alcohol use: Not Currently    Drug use: Never    Sexual activity: Not Currently   Other Topics Concern    Not on file   Social History Narrative    Not on file     Social Determinants of Health     Financial Resource Strain: Unknown    Difficulty of Paying Living Expenses: Patient refused   Food Insecurity: Unknown    Worried About Running Out of Food in the Last Year: Patient refused    Ran Out of Food in the Last Year: Patient refused   Transportation Needs: Not on file   Physical Activity: Insufficiently Active    Days of Exercise per Week: 2 days    Minutes of Exercise per Session: 10 min   Stress: Not on file   Social Connections: Not on file   Intimate Partner Violence: Not At Risk    Fear of Current or Ex-Partner: No    Emotionally Abused: No    Physically Abused: No    Sexually Abused: No   Housing Stability: Not on file         ALLERGIES: Patient has no known allergies. Review of Systems   Constitutional:  Negative for fever. Respiratory:  Negative for shortness of breath. Cardiovascular:  Positive for chest pain. Psychiatric/Behavioral:  Positive for agitation, behavioral problems and confusion. Vitals:    03/26/23 1734   BP: 124/68   Pulse: 62   Resp: 19   Temp: 97.4 °F (36.3 °C)   SpO2: 96%   Weight: 78.2 kg (172 lb 6.4 oz)            Physical Exam  Vitals and nursing note reviewed. Constitutional:       General: He is not in acute distress. Appearance: Normal appearance. He is not ill-appearing. HENT:      Head: Normocephalic and atraumatic. Right Ear: External ear normal.      Left Ear: External ear normal.      Nose: Nose normal.      Mouth/Throat:      Mouth: Mucous membranes are moist.      Pharynx: Oropharynx is clear. Eyes:      Extraocular Movements: Extraocular movements intact. Conjunctiva/sclera: Conjunctivae normal.   Cardiovascular:      Rate and Rhythm: Bradycardia present. Rhythm irregular. Pulses: Normal pulses. Heart sounds: Normal heart sounds. Pulmonary:      Effort: Pulmonary effort is normal. No respiratory distress. Breath sounds: Normal breath sounds. No wheezing. Abdominal:      General: Abdomen is flat. Bowel sounds are normal.      Palpations: Abdomen is soft. Tenderness: There is no abdominal tenderness. There is no guarding. Genitourinary:     Comments: Deferred  Musculoskeletal:         General: No swelling. Normal range of motion. Cervical back: Normal range of motion. Skin:     General: Skin is warm and dry. Capillary Refill: Capillary refill takes less than 2 seconds. Findings: No rash. Neurological:      General: No focal deficit present. Mental Status: He is alert. He is disoriented and confused. Comments: Moving all extremities   Psychiatric:         Behavior: Behavior is agitated. Medical Decision Making  Differential includes not limited to stroke, intracranial hemorrhage, ACS, electrolyte abnormality, sepsis. Amount and/or Complexity of Data Reviewed  Labs: ordered. Radiology: ordered. Risk  Prescription drug management. Decision regarding hospitalization. ED Course as of 03/26/23 2005   Sun Mar 26, 2023   1738 ECG performed at 1727 shows atrial fibrillation with a rate of 60, left anterior fascicular block. No STEMI. [WG]      ED Course User Index  [WG] Areli Guerrier DO       Procedures    Perfect Serve Consult for Admission  8:05 PM    ED Room Number: SER03/03  Patient Name and age:  Melissa Da Silva 82 y.o.  male  Working Diagnosis:   1. Chest pain, unspecified type    2. Confusion    3. Agitation    4. Bradycardia        COVID-19 Suspicion:  no  Sepsis present:  no  Reassessment needed: no  Code Status:  Full Code  Readmission: no  Isolation Requirements:  no  Recommended Level of Care:  telemetry  Department:[place] [facility] - [phone]  Other:    21-year-old male with history of dementia comes from dementia unit by EMS for evaluation of agitation, confusion more than baseline. Did have some chest pain earlier today as well. Patient has a history of dementia is accompanied by his daughter was reportedly hitting staff and as agitated. Did had reported having some chest pain. On arrival patient's heart rate is 62 he is in A-fib by monitor blood pressure stable 124/68. Patient has had some variable heart rate from the 40s to the 50s in A-fib.   His blood pressure has remained stable throughout his visit most recently 154/68. Patient has a history of dementia and is currently now sleeping. Resting comfortably in the ED bed. He is afebrile. Not hypoxic or tachypneic. His laboratory work-up included CBC which is reassuring no anemia no leukocytosis. Cardiac troponin of 8. ECG shows A-fib with bradycardia. His CMP is reassuring. CT of the head without shows no evidence of acute intracranial abnormalities. Chronic changes, chest x-ray is normal.  Had a long discussion with the patient's daughter regarding his A-fib and bradycardia. Given the patient's blood pressure is currently stable and the patient is resting comfortably I have chosen to give patient a single dose of atropine to evaluate responsiveness. I discussed admission to the hospital for further evaluation for the patient's confusion, combativeness, and bradycardia. I discussed with the patient's daughter further treatments including vasopressors, pacemaker placement. Patient's daughter was concerned about the possibility of possible pacemaker. This is given the patient's history of dementia and might not be able to tolerate this procedure. I discussed the patient is currently hemodynamically stable with a blood pressure which is acceptable at this moment. Patient requires further admission and cardiac monitoring and discussions with cardiology. Jose Elias Castrejon DO has spent 45 minutes of critical care time involved in lab review, consultations with specialist, family decision-making, and documentation. During this entire length of time I was immediately available to the patient. Critical Care: The reason for providing this level of medical care for this critically ill patient was due a critical illness that impaired one or more vital organ systems such that there was a high probability of imminent or life threatening deterioration in the patients condition.  This care involved high complexity decision making to assess, manipulate, and support vital system functions, to treat this degreee vital organ system failure and to prevent further life threatening deterioration of the patients condition.

## 2023-03-26 NOTE — ED TRIAGE NOTES
Patient arrives with daughter with c/o altered mental status (unsure when last known well due to dementia and psych med use) per daughter, staff at nursing home informed her that patient has been more agitated, hitting people and more altered than normal. No known falls. Daughter denies any known urinary symptoms but reported to daughter that his chest hurt. HR in the 40's per daughter.

## 2023-03-27 ENCOUNTER — APPOINTMENT (OUTPATIENT)
Dept: GENERAL RADIOLOGY | Age: 83
DRG: 057 | End: 2023-03-27
Attending: FAMILY MEDICINE
Payer: MEDICARE

## 2023-03-27 VITALS
OXYGEN SATURATION: 94 % | WEIGHT: 172 LBS | SYSTOLIC BLOOD PRESSURE: 129 MMHG | HEART RATE: 64 BPM | BODY MASS INDEX: 24.68 KG/M2 | DIASTOLIC BLOOD PRESSURE: 69 MMHG | TEMPERATURE: 96.5 F | RESPIRATION RATE: 12 BRPM

## 2023-03-27 PROBLEM — R07.9 CHEST PAIN: Status: ACTIVE | Noted: 2023-03-27

## 2023-03-27 LAB
APPEARANCE UR: CLEAR
ATRIAL RATE: 119 BPM
ATRIAL RATE: 91 BPM
BACTERIA URNS QL MICRO: ABNORMAL /HPF
BILIRUB UR QL: NEGATIVE
CALCULATED R AXIS, ECG10: -58 DEGREES
CALCULATED R AXIS, ECG10: -63 DEGREES
CALCULATED T AXIS, ECG11: -15 DEGREES
CALCULATED T AXIS, ECG11: 15 DEGREES
COLOR UR: ABNORMAL
DIAGNOSIS, 93000: NORMAL
DIAGNOSIS, 93000: NORMAL
EPITH CASTS URNS QL MICRO: ABNORMAL /LPF
GLUCOSE UR STRIP.AUTO-MCNC: NEGATIVE MG/DL
HGB UR QL STRIP: NEGATIVE
KETONES UR QL STRIP.AUTO: NEGATIVE MG/DL
LEUKOCYTE ESTERASE UR QL STRIP.AUTO: ABNORMAL
MUCOUS THREADS URNS QL MICRO: ABNORMAL /LPF
NITRITE UR QL STRIP.AUTO: NEGATIVE
PH UR STRIP: 5.5 (ref 5–8)
PROT UR STRIP-MCNC: NEGATIVE MG/DL
Q-T INTERVAL, ECG07: 458 MS
Q-T INTERVAL, ECG07: 474 MS
QRS DURATION, ECG06: 102 MS
QRS DURATION, ECG06: 114 MS
QTC CALCULATION (BEZET), ECG08: 461 MS
QTC CALCULATION (BEZET), ECG08: 474 MS
RBC #/AREA URNS HPF: ABNORMAL /HPF (ref 0–5)
SP GR UR REFRACTOMETRY: 1.02 (ref 1–1.03)
UA: UC IF INDICATED,UAUC: ABNORMAL
UROBILINOGEN UR QL STRIP.AUTO: 1 EU/DL (ref 0.2–1)
VENTRICULAR RATE, ECG03: 60 BPM
VENTRICULAR RATE, ECG03: 61 BPM
WBC URNS QL MICRO: ABNORMAL /HPF (ref 0–4)

## 2023-03-27 PROCEDURE — 74011250637 HC RX REV CODE- 250/637: Performed by: FAMILY MEDICINE

## 2023-03-27 PROCEDURE — 73030 X-RAY EXAM OF SHOULDER: CPT

## 2023-03-27 PROCEDURE — 74011250637 HC RX REV CODE- 250/637: Performed by: HOSPITALIST

## 2023-03-27 PROCEDURE — 36415 COLL VENOUS BLD VENIPUNCTURE: CPT

## 2023-03-27 PROCEDURE — 74011000250 HC RX REV CODE- 250: Performed by: FAMILY MEDICINE

## 2023-03-27 PROCEDURE — 93005 ELECTROCARDIOGRAM TRACING: CPT

## 2023-03-27 PROCEDURE — 81001 URINALYSIS AUTO W/SCOPE: CPT

## 2023-03-27 PROCEDURE — 71101 X-RAY EXAM UNILAT RIBS/CHEST: CPT

## 2023-03-27 RX ORDER — SODIUM CHLORIDE 0.9 % (FLUSH) 0.9 %
5-40 SYRINGE (ML) INJECTION EVERY 8 HOURS
Status: DISCONTINUED | OUTPATIENT
Start: 2023-03-27 | End: 2023-03-27 | Stop reason: HOSPADM

## 2023-03-27 RX ORDER — LIDOCAINE 4 G/100G
1 PATCH TOPICAL DAILY PRN
Status: DISCONTINUED | OUTPATIENT
Start: 2023-03-27 | End: 2023-03-27 | Stop reason: HOSPADM

## 2023-03-27 RX ORDER — QUETIAPINE FUMARATE 50 MG/1
50 TABLET, EXTENDED RELEASE ORAL
Qty: 30 TABLET | Refills: 1 | Status: SHIPPED | OUTPATIENT
Start: 2023-03-27 | End: 2023-03-27 | Stop reason: SDUPTHER

## 2023-03-27 RX ORDER — LISINOPRIL 10 MG/1
10 TABLET ORAL DAILY
Status: DISCONTINUED | OUTPATIENT
Start: 2023-03-27 | End: 2023-03-27 | Stop reason: HOSPADM

## 2023-03-27 RX ORDER — QUETIAPINE FUMARATE 25 MG/1
12.5 TABLET, FILM COATED ORAL
Status: DISCONTINUED | OUTPATIENT
Start: 2023-03-28 | End: 2023-03-27

## 2023-03-27 RX ORDER — ACETAMINOPHEN 650 MG/1
650 SUPPOSITORY RECTAL
Status: DISCONTINUED | OUTPATIENT
Start: 2023-03-27 | End: 2023-03-27 | Stop reason: HOSPADM

## 2023-03-27 RX ORDER — QUETIAPINE FUMARATE 25 MG/1
12.5 TABLET, FILM COATED ORAL 2 TIMES DAILY
Qty: 30 TABLET | Refills: 1 | Status: SHIPPED | OUTPATIENT
Start: 2023-03-27

## 2023-03-27 RX ORDER — DONEPEZIL HYDROCHLORIDE 10 MG/1
10 TABLET, FILM COATED ORAL
Status: DISCONTINUED | OUTPATIENT
Start: 2023-03-27 | End: 2023-03-27 | Stop reason: HOSPADM

## 2023-03-27 RX ORDER — ONDANSETRON 4 MG/1
4 TABLET, ORALLY DISINTEGRATING ORAL
Status: DISCONTINUED | OUTPATIENT
Start: 2023-03-27 | End: 2023-03-27 | Stop reason: HOSPADM

## 2023-03-27 RX ORDER — QUETIAPINE FUMARATE 25 MG/1
50 TABLET, FILM COATED ORAL 2 TIMES DAILY
Status: DISCONTINUED | OUTPATIENT
Start: 2023-03-27 | End: 2023-03-27 | Stop reason: HOSPADM

## 2023-03-27 RX ORDER — ATORVASTATIN CALCIUM 10 MG/1
10 TABLET, FILM COATED ORAL DAILY
Status: DISCONTINUED | OUTPATIENT
Start: 2023-03-27 | End: 2023-03-27 | Stop reason: HOSPADM

## 2023-03-27 RX ORDER — ONDANSETRON 2 MG/ML
4 INJECTION INTRAMUSCULAR; INTRAVENOUS
Status: DISCONTINUED | OUTPATIENT
Start: 2023-03-27 | End: 2023-03-27 | Stop reason: HOSPADM

## 2023-03-27 RX ORDER — QUETIAPINE FUMARATE 25 MG/1
12.5 TABLET, FILM COATED ORAL DAILY
Qty: 30 TABLET | Refills: 1 | Status: SHIPPED | OUTPATIENT
Start: 2023-03-28

## 2023-03-27 RX ORDER — QUETIAPINE FUMARATE 25 MG/1
12.5 TABLET, FILM COATED ORAL
Status: DISCONTINUED | OUTPATIENT
Start: 2023-03-27 | End: 2023-03-27 | Stop reason: HOSPADM

## 2023-03-27 RX ORDER — ACETAMINOPHEN 325 MG/1
650 TABLET ORAL
Status: DISCONTINUED | OUTPATIENT
Start: 2023-03-27 | End: 2023-03-27 | Stop reason: HOSPADM

## 2023-03-27 RX ORDER — POLYETHYLENE GLYCOL 3350 17 G/17G
17 POWDER, FOR SOLUTION ORAL DAILY PRN
Status: DISCONTINUED | OUTPATIENT
Start: 2023-03-27 | End: 2023-03-27 | Stop reason: HOSPADM

## 2023-03-27 RX ORDER — QUETIAPINE FUMARATE 25 MG/1
12.5 TABLET, FILM COATED ORAL EVERY MORNING
Qty: 30 TABLET | Refills: 1 | Status: SHIPPED | OUTPATIENT
Start: 2023-03-28 | End: 2023-03-27 | Stop reason: SDUPTHER

## 2023-03-27 RX ORDER — QUETIAPINE FUMARATE 25 MG/1
12.5 TABLET, FILM COATED ORAL EVERY MORNING
Status: DISCONTINUED | OUTPATIENT
Start: 2023-03-28 | End: 2023-03-27 | Stop reason: HOSPADM

## 2023-03-27 RX ORDER — QUETIAPINE FUMARATE 50 MG/1
50 TABLET, EXTENDED RELEASE ORAL
Qty: 30 TABLET | Refills: 1 | Status: SHIPPED | OUTPATIENT
Start: 2023-03-27

## 2023-03-27 RX ORDER — QUETIAPINE FUMARATE 25 MG/1
12.5 TABLET, FILM COATED ORAL
Status: DISCONTINUED | OUTPATIENT
Start: 2023-03-27 | End: 2023-03-27

## 2023-03-27 RX ORDER — SODIUM CHLORIDE 0.9 % (FLUSH) 0.9 %
5-40 SYRINGE (ML) INJECTION AS NEEDED
Status: DISCONTINUED | OUTPATIENT
Start: 2023-03-27 | End: 2023-03-27 | Stop reason: HOSPADM

## 2023-03-27 RX ADMIN — LISINOPRIL 10 MG: 10 TABLET ORAL at 08:47

## 2023-03-27 RX ADMIN — QUETIAPINE FUMARATE 12.5 MG: 25 TABLET ORAL at 13:02

## 2023-03-27 RX ADMIN — SODIUM CHLORIDE, PRESERVATIVE FREE 10 ML: 5 INJECTION INTRAVENOUS at 06:00

## 2023-03-27 RX ADMIN — ATORVASTATIN CALCIUM 10 MG: 10 TABLET, FILM COATED ORAL at 08:47

## 2023-03-27 RX ADMIN — RIVAROXABAN 20 MG: 20 TABLET, FILM COATED ORAL at 08:47

## 2023-03-27 RX ADMIN — SODIUM CHLORIDE, PRESERVATIVE FREE 10 ML: 5 INJECTION INTRAVENOUS at 13:03

## 2023-03-27 NOTE — ED NOTES
Emergency Room Nursing Note        Patient Name: Laith Manzano      : 1940             MRN: [id number]      Chief Complaint:  Altered mental status      Admit Diagnosis: Acute delirium [R41.0]      Admitting Provider: Tootie Porter MD      Surgery: * No surgery found *           Attempted to call report to the unit but was informed that receiving RN was not available. Will attempt to call back.          Lines:   Peripheral IV 23 Left Antecubital (Active)         Signed by: Rosa Ceron RN, JASS, BSN, [facility]                                              3/26/2023 at 11:48 PM

## 2023-03-27 NOTE — ED NOTES
Emergency Room Nursing Note        Patient Name: Artemio Walters      : 1940             MRN: [id number]      Chief Complaint:  Altered mental status      Admit Diagnosis: Acute delirium [R41.0]      Admitting Provider: Michelle Robb MD      Surgery: * No surgery found *           This RN informed Dr. Dwain Zimmerman that patient is starting to be Bradycardic again post Atropine administration. MD states rpt ok for now but to reassess if patient goes back down to the 40's.          Lines:   Peripheral IV 23 Left Antecubital (Active)         Signed by: Su Camarillo RN, JASS, BSN, [facility]                                              3/26/2023 at 10:45 PM

## 2023-03-27 NOTE — PROGRESS NOTES
Problem: Falls - Risk of  Goal: *Absence of Falls  Description: Document Marissa Trevizo Fall Risk and appropriate interventions in the flowsheet.   3/27/2023 1320 by Chiara Padilla RN  Outcome: Resolved/Met  Note: Fall Risk Interventions:  Mobility Interventions: PT Consult for mobility concerns    Mentation Interventions: Bed/chair exit alarm         Elimination Interventions: Bed/chair exit alarm           3/27/2023 1318 by Chiara Padilla RN  Note: Fall Risk Interventions:  Mobility Interventions: PT Consult for mobility concerns    Mentation Interventions: Bed/chair exit alarm         Elimination Interventions: Bed/chair exit alarm              Problem: Patient Education: Go to Patient Education Activity  Goal: Patient/Family Education  Outcome: Resolved/Met

## 2023-03-27 NOTE — PROGRESS NOTES
Primary Nurse Chon Fry RN and seth RN performed a dual skin assessment on this patient No impairment noted  Gigi score is 18    POA milagros/daughter at bedside. Pt resting comfortably in bed, tele monitor on, cardiac rhythm afib from bradycardic to sinus. Will continue to monitor closely.

## 2023-03-27 NOTE — PROGRESS NOTES
Bedside and Verbal shift change report given to Edenilson Chanel RN (oncoming nurse) by carly freedman RN (offgoing nurse). Report included the following information SBAR, Kardex, Recent Results, and Cardiac Rhythm afib with bradycardia . Problem: Falls - Risk of  Goal: *Absence of Falls  Description: Document Lex Brice Fall Risk and appropriate interventions in the flowsheet.   Outcome: Progressing Towards Goal  Note: Fall Risk Interventions:  Mobility Interventions: PT Consult for mobility concerns    Mentation Interventions: Bed/chair exit alarm         Elimination Interventions: Bed/chair exit alarm

## 2023-03-27 NOTE — PROGRESS NOTES
Transition Of Care: Update 2:40pm: CM spoke with Tommy Sweeney ([phone]) in [facility] and she received discharge summary and is ready for the patient to admit back to [facility]. No report needed. RN notified. 9:44am: CM awaiting call back from liaison at [facility] to discuss the patient 's return. The patient and daughter, Melania Rubi plans for the patient to return to [facility] today with family to transport when stable for discharge. RUR: 11%    Family contact: Daughter: Melania Rubi: [phone]    [facility], address: [address] [place] [address],[address] [facility], [address]  Phone: [phone]  Fax: [phone]    Noted, IM letter signed 3/26/23    Care Management Interventions  PCP Verified by CM: Yes  Palliative Care Criteria Met (RRAT>21 & CHF Dx)?: No  Mode of Transport at Discharge: Other (see comment)  Transition of Care Consult (CM Consult): Discharge Planning, Assisted Living  MyChart Signup: Yes  Discharge Durable Medical Equipment: No  Health Maintenance Reviewed: Yes  Physical Therapy Consult: No  Occupational Therapy Consult: No  Speech Therapy Consult: No  Support Systems: Child(alexander)  Confirm Follow Up Transport: Family  The Plan for Transition of Care is Related to the Following Treatment Goals : The patient and family plan for the patient to return to [facility].   The Patient and/or Patient Representative was Provided with a Choice of Provider and Agrees with the Discharge Plan?: Yes  Freedom of Choice List was Provided with Basic Dialogue that Supports the Patient's Individualized Plan of Care/Goals, Treatment Preferences and Shares the Quality Data Associated with the Providers?: Yes  [place] Resource Information Provided?: No  Discharge Location  Patient Expects to be Discharged to[de-identified] Assisted Living     Reason for Admission:  Chest Pain                     RUR Score:   11%                  Plan for utilizing home health:   No indications at this time. PCP: First and Last name:  Veena Calvin MD     Name of Practice:    Are you a current patient: Yes/No: Y   Approximate date of last visit: March, 2023   Can you participate in a virtual visit with your PCP: N                    Current Advanced Directive/Advance Care Plan: DNR      Healthcare Decision Maker:   Click here to complete [employer] including selection of the Healthcare Decision Maker Relationship (ie \"Primary\")           Daughter: Jaron Hi: [phone]                  Transition of Care Plan:     CM met with the patient (confused) and daughter in room 210-2. The daughter states her father is staying at [facility] and been there since Dec/2022. The daughter plans for the patient to return there when stable for discharge. The patient is ambulatory and does not use any dme. The daughterSerg plans to transport the patient back to the facility when stable for discharge. CM following for discharge needs.     Rosa Bethea RN/CRM

## 2023-03-27 NOTE — DISCHARGE INSTRUCTIONS
Discharge Instructions       PATIENT ID: Jason Gray  MRN: [id number]   YOB: 1940    DATE OF ADMISSION: [unfilled]    DATE OF DISCHARGE: 3/27/2023    PRIMARY CARE PROVIDER: @PCP@     ATTENDING PHYSICIAN: [unfilled]  DISCHARGING PROVIDER: Masood Hess MD    To contact this individual call [phone] and ask the  to page. If unavailable ask to be transferred the Adult Hospitalist Department. DISCHARGE DIAGNOSES Dementia with agitation    CONSULTATIONS: Psychiatry    PROCEDURES/SURGERIES: * No surgery found *    PENDING TEST RESULTS:   At the time of discharge the following test results are still pending: none    FOLLOW UP APPOINTMENTS:   PMD    ADDITIONAL CARE RECOMMENDATIONS:   Please make sure that the patient is given readjusted regimen of Seroquel    DIET: Cardiac Diet    ACTIVITY: Activity as tolerated      DISCHARGE MEDICATIONS:   See Medication Reconciliation Form    It is important that you take the medication exactly as they are prescribed. Keep your medication in the bottles provided by the pharmacist and keep a list of the medication names, dosages, and times to be taken in your wallet. Do not take other medications without consulting your doctor. NOTIFY YOUR PHYSICIAN FOR ANY OF THE FOLLOWING:   Fever over 101 degrees for 24 hours. Chest pain, shortness of breath, fever, chills, nausea, vomiting, diarrhea, change in mentation, falling, weakness, bleeding. Severe pain or pain not relieved by medications. Or, any other signs or symptoms that you may have questions about.       DISPOSITION:    Home With:   OT  PT  HH  RN      x SNF/Inpatient Rehab/LTAC    Independent/assisted living    Hospice    Other:     CDMP Checked:   Yes x     PROBLEM LIST Updated:  Yes x       Signed:   Masood Hses MD  3/27/2023  9:36 AM

## 2023-03-27 NOTE — PROGRESS NOTES
Hospitalist Progress Note  Nestor Dixon MD  Answering service: [phone] from in house phone        Date of Service:  3/27/2023  NAME:  Gisel Finley  :  1940  MRN:  [id number]      Admission Summary:   Gisel Finley is a 82 y.o. male with apmhx atrial fibrillation, alzheimers dementia who presents from his memory care unit with increased confusion, and agitation, and left-sided chest pain with bradycardia. Per patient's daughter who gives history due to patient's advanced dementia, she received a call from the memory care unit stating that her father was being aggressive towards staff, and wandering into other patients rooms. Patient's daughter went to redirect her father, and he subsequently clutched his chest, and said \"ouch\" as if it was hurting him. Patient's daughter asked staff to check his vital signs, and his heart rate was noted to be bradycardic to the 30s. He is on metoprolol for atrial fibrillation. Patient's daughter also is concerned because she has an appointment to see a psychiatrist, and feels like his Seroquel is not working to modify his behavior, but is causing decreased appetite. She reports that he is getting his seroquel prn at the facility, and this is nor working. He had a visit with a PCP on 3/23, and discussed hospice with family for advanced dementia with agitation. His seroquel dose has recently been increased     In the ED, he was bradycardic to 48. Other VSS. Labs were grossly unremarkable. CT head with no acute intracranial abnormality. Interval history / Subjective:    Follow up agitation  Comfortably laying in bed  Happily confused  No complaints of chest/shoulder pain  Daughter at bedside     Assessment & Plan:     #Advanced Dementia  -continue seroquel at 50mg BID/Aricept  -Appreciate psych input, awaiting formal note #Bradycardia--improved  #Atrial Fibrillation  -rec'd atropine for bradycardia to 48  -TSH normal at 1.8  -hold home metoprolol  -bradycardia has improved after holding BB, continue  -continue xarelto     #Left chest pain  -EKG with no ischemic change, and trop normal times two, port CXR without acute cardiopulmonary abnormality  -no bruising or gross joint deformity or joint pain noted on exam  -left shoulder and rib series XR, awaiting impression  -pain control with lidocaine patch if needed     #HTN: continue lisinopril  #dyslipidemia: continue home statin     Cardiac diet       Code status: DNR  Prophylaxis: Xarelto    Plan: Follow psych input, if cleared, will discharge the patient back to nursing facility. The daughter is fine with that plan. CM informed  Care Plan discussed with: Patient, daughter, RN, CM  Anticipated Disposition: back to facility     Hospital Problems  Date Reviewed: 3/27/2023            Codes Class Noted POA    * (Principal) Chest pain ICD-10-CM: R07.9  ICD-9-CM: 786.50  3/27/2023 Yes        Acute delirium ICD-10-CM: R41.0  ICD-9-CM: 780.09  3/26/2023 Unknown               Review of Systems:   A comprehensive review of systems was negative except for that written in the HPI. Vital Signs:    Last 24hrs VS reviewed since prior progress note.  Most recent are:  Visit Vitals  /71 (BP 1 Location: Right upper arm, BP Patient Position: At rest;Lying)   Pulse 84   Temp 98 °F (36.7 °C)   Resp 12   Wt 78 kg (172 lb)   SpO2 96%   BMI 24.68 kg/m²       No intake or output data in the 24 hours ending 03/27/23 0930     Physical Examination:     I had a face to face encounter with this patient and independently examined them on 3/27/2023 as outlined below:          General : alert ,awake, oriented times zero, no acute distress,   HEENT: PEERL, EOMI, moist mucus membrane, TM clear  Neck: supple, no JVD, no meningeal signs  Chest: Clear to auscultation bilaterally   CVS: S1 S2 heard, Capillary refill less than 2 seconds  Abd: soft/ non tender, non distended, BS physiological,   Ext: no clubbing, no cyanosis, no edema, brisk 2+ DP pulses  Neuro/Psych: pleasant mood and affect, CN 2-12 grossly intact, sensory grossly within normal limit, Strength 5/5 in all extremities, DTR 1+ x 4  Skin: warm            Data Review:    Review and/or order of clinical lab test    I have independently reviewed and interpreted patient's lab and all other diagnostic data    Notes reviewed from all clinical/nonclinical/nursing services involved in patient's clinical care. Care coordination discussions were held with appropriate clinical/nonclinical/ nursing providers based on care coordination needs. Labs:     Recent Labs     03/26/23 1738 03/24/23  1037   WBC 6.0 8.1   HGB 14.2 14.8   HCT 43.0 47.1    187     Recent Labs     03/26/23 1738 03/24/23  1037    143   K 3.8 4.1    108   CO2 31 32   BUN 24* 22*   CREA 0.84 0.83   GLU 88 82   CA 8.8 9.1   MG 2.6* 3.0*     Recent Labs     03/26/23 1738 03/24/23  1037   ALT 24 22    114   TBILI 1.1* 1.0   TP 6.9 6.8   ALB 3.9 4.0   GLOB 3.0 2.8     No results for input(s): INR, PTP, APTT, INREXT in the last 72 hours. No results for input(s): FE, TIBC, PSAT, FERR in the last 72 hours. Lab Results   Component Value Date/Time    Folate 11.2 03/24/2023 10:37 AM      No results for input(s): PH, PCO2, PO2 in the last 72 hours. No results for input(s): CPK, CKNDX, TROIQ in the last 72 hours.     No lab exists for component: CPKMB  No results found for: CHOL, CHOLX, CHLST, CHOLV, HDL, HDLP, LDL, LDLC, DLDLP, TGLX, TRIGL, TRIGP, CHHD, CHHDX  No results found for: [facility]  Lab Results   Component Value Date/Time    Color YELLOW/STRAW 03/27/2023 07:04 AM    Appearance CLEAR 03/27/2023 07:04 AM    Specific gravity 1.022 03/27/2023 07:04 AM    pH (UA) 5.5 03/27/2023 07:04 AM    Protein Negative 03/27/2023 07:04 AM    Glucose Negative 03/27/2023 07:04 AM    Ketone Negative 03/27/2023 07:04 AM    Bilirubin Negative 03/27/2023 07:04 AM    Urobilinogen 1.0 03/27/2023 07:04 AM    Nitrites Negative 03/27/2023 07:04 AM    Leukocyte Esterase SMALL (A) 03/27/2023 07:04 AM    Epithelial cells FEW 03/27/2023 07:04 AM    Bacteria 1+ (A) 03/27/2023 07:04 AM    WBC 5-10 03/27/2023 07:04 AM    RBC 0-5 03/27/2023 07:04 AM         Medications Reviewed:     Current Facility-Administered Medications   Medication Dose Route Frequency    sodium chloride (NS) flush 5-40 mL  5-40 mL IntraVENous Q8H    sodium chloride (NS) flush 5-40 mL  5-40 mL IntraVENous PRN    acetaminophen (TYLENOL) tablet 650 mg  650 mg Oral Q6H PRN    Or    acetaminophen (TYLENOL) suppository 650 mg  650 mg Rectal Q6H PRN    polyethylene glycol (MIRALAX) packet 17 g  17 g Oral DAILY PRN    ondansetron (ZOFRAN ODT) tablet 4 mg  4 mg Oral Q8H PRN    Or    ondansetron (ZOFRAN) injection 4 mg  4 mg IntraVENous Q6H PRN    rivaroxaban (XARELTO) tablet 20 mg  20 mg Oral DAILY    QUEtiapine (SEROquel) tablet 50 mg  50 mg Oral BID    lisinopriL (PRINIVIL, ZESTRIL) tablet 10 mg  10 mg Oral DAILY    donepeziL (ARICEPT) tablet 10 mg  10 mg Oral QHS    atorvastatin (LIPITOR) tablet 10 mg  10 mg Oral DAILY    lidocaine 4 % patch 1 Patch  1 Patch TransDERmal DAILY PRN     ______________________________________________________________________  EXPECTED LENGTH OF STAY: - - -  ACTUAL LENGTH OF STAY:          1                 Chino Delacruz MD

## 2023-03-27 NOTE — BSMART NOTE
Initial BSMART Liaison Assessment Form     Section I - Integrated Summary    Chief Complaint is dementia with behavioral disturbance and help with medication management. LOS: 18 hours     Presenting problem/Summary: This writer met face to face with pt and his daughter, (and [place]), at his bedside. Pt appears cheerful, upbeat, alert, and oriented to his first name upon assessment. Pt sitting up in bed calmly, and he is cooperative throughout the encounter. Pt gives mostly nonsensical and irrelevant responses to questions posed to him. He tells this writer/liaison that the season is Fall. He gives the month as \"sayed,\" and the year as \"since. \" Pt tells liaison, \"it's good,\" with regard to SI, but daughter confirms that pt has had no recent SI, nor has he a hx of suicide attempts. Pt denies HI, and this is confirmed by the daughter. Daughter reports that pt obviously seeing and hearing things as evidenced by him talking to himself, trying to  things that aren't there, and asking daughter if she saw \"that vesta,\" when there is no one there. Daughter explains that pt has had no behavioral issues when with staff provided by the family at his facility to monitor him. Pt seems to become agitated with staff particularly associated with the facility. Daughter explains that pt models his moods and behavior in association with the staff's energy around him. In this context, staff contacted daughter to help yesterday. Pt aggressive, combative, yelling, and hitting people. He inadvertently went into different people's rooms, Daughter explains that pt \"clearly\" looking for his own room because when she escorted him to his room, he immediately went to his bed and \"passed out. \"   Per daughter, pt reportedly declining over the last 3 months, with episodes of difficulty eating, drinking even swallowing.  She reports that Seroquel was increased from 50 mg to 100 mg on 2/10/23, and she believes the increase contributed to possible side effects like restlessness, insomnia, agitation, and even aggression. Precipitant Factors are recent medication changes, medical problems, psychomotor agitation, and aggression at his memory care facility. Pt's wife  of covid pneumonia last year and she was pt's primary caregiver. The information is given by the pt and his adult daughter. Current Psychiatrist and/or  is unknown. Previous Hospitalizations/Treatment: unknown. Lethality Assessment:  The potential for suicide noted by the following: not noted. The potential for homicide is not noted. The patient has not been a perpetrator of sexual or physical abuse. There are not pending charges. The patient is not felt to be at risk for self-harm or harm to others at the time of this assessment. Section II - Psychosocial  The patient's overall mood and attitude ranges from jovial to frustrated. Feelings of helplessness and hopelessness are not observed. Generalized anxiety is not observed. Panic is not observed. Phobias are not observed. Obsessive compulsive tendencies are not observed. Section III - Mental Status Exam  The patient's appearance shows no evidence of impairment. The patient's behavior shows no evidence of impairment. The patient is oriented to first name. The patient's speech is slowed and is impoverished. The patient's mood ranges from happy to frustrated. The range of affect is constricted. The patient's thought content demonstrates RENETTA. The thought process appears confused and disorganized apparently at baseline. The patient's perception demonstrated changes in the following:  auditory  visual hallucinations per daughter. The patient's memory is impaired. The patient's appetite is decreased and shows signs of weight loss. The patient's sleep has evidence of insomnia. The patient shows no insight. The patient's judgement is cognitively impaired.       The patient has not demonstrated mental capacity to provide informed consent. Section IV - Substance Abuse  The patient is not using substances. The daughter reports that pt has no hx of abusing substances. UDS result: not available. BAL: not available. Section V - Medical  Past Medical History:   Diagnosis Date    Alzheimer disease ([facility] [address]     Dementia ([facility] [address]        Section VI - Living Arrangements  The patient is . The patient lives at a memory care facility. The patient has 8 adult children. The patient does plan to facility upon discharge. The patient's source of income comes from long-term and social security. The patient's greatest support comes from family and this person will be involved with the treatment. The patient has not been in an event described as horrible or outside the realm of ordinary life experience either currently or in the past. The patient has not been a victim of sexual/physical abuse. Section VII - Other Areas of Clinical Concern    The highest grade achieved is 12th grade with the overall quality of school experience being described as unknown. (Pt was a  for the [employer] The patient is currently retired and speaks English as a primary language. The patient has the following communication impairment;  aphasia affecting communication. The patient's preference for learning can be described as: can read and write adequately. The patient's hearing is hard of hearing and uses a hearing aid. The patient's vision is normal.    The patient reports coping skills include: reassurance and support from caregivers. Family has provided extra support staff for him, and they take him for visits at their homes.     Carolynn Brizuela LCSW

## 2023-03-27 NOTE — PROGRESS NOTES
TRANSFER - IN REPORT:    Verbal report received from Jay(name) on Michael Bowles  being received from [facility](unit) for routine progression of care      Report consisted of patients Situation, Background, Assessment and   Recommendations(SBAR). Information from the following report(s) ED Summary and Recent Results was reviewed with the receiving nurse. Opportunity for questions and clarification was provided. Assessment completed upon patients arrival to unit and care assumed.

## 2023-03-27 NOTE — DISCHARGE SUMMARY
Discharge Summary       PATIENT ID: Sony Mercado  MRN: [id number]   YOB: 1940    DATE OF ADMISSION: 3/26/2023  5:21 PM    DATE OF DISCHARGE: 3/27/2023   PRIMARY CARE PROVIDER: Jazmin Feldman MD     ATTENDING PHYSICIAN: Dr Catarino De Anda  DISCHARGING PROVIDER: Catarino De Anda MD      CONSULTATIONS: IP CONSULT TO HOSPITALIST  IP CONSULT TO PSYCHIATRY    PROCEDURES/SURGERIES: * No surgery found *    [address] [facility],[address] COURSE:   #Advanced Dementia  -continue seroquel at 50mg BID/Aricept  -Appreciate psych input, readjusted medications     #Bradycardia--improved  #Atrial Fibrillation  -rec'd atropine for bradycardia to 48  -TSH normal at 1.8  -hold home metoprolol  -bradycardia has improved after holding BB, continue  -continue xarelto     #Left chest pain  -EKG with no ischemic change, and trop normal times two, port CXR without acute cardiopulmonary abnormality  -no bruising or gross joint deformity or joint pain noted on exam  -left shoulder and rib series XR, awaiting impression  -pain control with lidocaine patch if needed     #HTN: continue lisinopril  #dyslipidemia: continue home statin      ADDITIONAL CARE RECOMMENDATIONS:   Follow up with PMD  Please make note of the changes in medications     DIET: Cardiac Diet    ACTIVITY: Activity as tolerated    NOTIFY YOUR PHYSICIAN FOR ANY OF THE FOLLOWING:   Fever over 101 degrees for 24 hours. Chest pain, shortness of breath, fever, chills, nausea, vomiting, diarrhea, change in mentation, falling, weakness, bleeding. Severe pain or pain not relieved by medications, as well as any other signs or symptoms that you may have questions about.     FOLLOW UP APPOINTMENTS:    Follow-up Information       Follow up With Specialties Details Why René Purcell MD Internal Medicine Physician Follow up in 1 week(s)  [address] [address]  [phone]               DISCHARGE MEDICATIONS:  Current Discharge Medication List        CONTINUE these medications which have NOT CHANGED    Details   ergocalciferol (ERGOCALCIFEROL) 1,250 mcg (50,000 unit) capsule Take 1 Capsule by mouth every seven (7) days. Qty: 12 Capsule, Refills: 1  Start date: 3/26/2023    Associated Diagnoses: Vitamin D deficiency      atorvastatin (LIPITOR) 10 mg tablet Take  by mouth daily. donepeziL (ARICEPT) 5 mg tablet Take  by mouth nightly. lisinopriL (PRINIVIL, ZESTRIL) 10 mg tablet Take  by mouth daily. QUEtiapine (SEROquel) 50 mg tablet Take 50 mg by mouth two (2) times a day. saw palmetto 450 mg cap Take  by mouth.      rivaroxaban (XARELTO) 20 mg tab tablet Take  by mouth daily. ibuprofen (MOTRIN) 800 mg tablet Take  by mouth. acetaminophen (TYLENOL) 500 mg tablet Take 2 Tablets by mouth every six (6) hours as needed for Pain or Fever. Qty: 20 Tablet, Refills: 0      lidocaine (Lido Rafael) 4 % patch Apply as needed every 12 hours for pain. Qty: 20 Patch, Refills: 0      diclofenac (Voltaren) 1 % gel Apply 4 g to affected area four (4) times daily.   Qty: 100 g, Refills: 0           STOP taking these medications       metoprolol succinate (TOPROL-XL) 25 mg XL tablet Comments:   Reason for Stopping:               DISPOSITION:  x  Home With:   OT  PT  HH  RN       Long term SNF/Inpatient Rehab    Independent/assisted living    Hospice    Other:     PATIENT CONDITION AT DISCHARGE:     Functional status    Poor     Deconditioned    x Independent      Cognition     Lucid     Forgetful    x Dementia      Catheters/lines (plus indication)    Zimmerman     PICC     PEG    x None      Code status     Full code    x DNR      PHYSICAL EXAMINATION AT DISCHARGE:    Please see progress note      CHRONIC MEDICAL DIAGNOSES:  Problem List as of 3/27/2023 Date Reviewed: 3/27/2023            Codes Class Noted - Resolved    * (Principal) Chest pain ICD-10-CM: R07.9  ICD-9-CM: 786.50  3/27/2023 - Present        Acute delirium ICD-10-CM: R41.0  ICD-9-CM: 780.09  3/26/2023 - Present           Greater than 38 minutes were spent with the patient on counseling and coordination of care    Signed:   Manav Franco MD  3/27/2023  9:37 AM    .

## 2023-03-27 NOTE — H&P
History and Physical    Date of Service:  3/27/2023  Primary Care Provider: Nakita Cuevas MD  Source of information: Chart review and Spouse/family member    Chief Complaint: Altered mental status      History of Presenting Illness:   Belinda Reis is a 82 y.o. male with apmhx atrial fibrillation, alzheimers dementia who presents from his memory care unit with increased confusion, and agitation, and left-sided chest pain with bradycardia. Per patient's daughter who gives history due to patient's advanced dementia, she received a call from the memory care unit stating that her father was being aggressive towards staff, and wandering into other patients rooms. Patient's daughter went to redirect her father, and he subsequently clutched his chest, and said \"ouch\" as if it was hurting him. Patient's daughter asked staff to check his vital signs, and his heart rate was noted to be bradycardic to the 30s. He is on metoprolol for atrial fibrillation. Patient's daughter also is concerned because she has an appointment to see a psychiatrist, and feels like his Seroquel is not working to modify his behavior, but is causing decreased appetite. She reports that he is getting his seroquel prn at the facility, and this is nor working. He had a visit with a PCP on 3/23, and discussed hospice with family for advanced dementia with agitation. His seroquel dose has recently been increased    In the ED, he was bradycardic to 48. Other VSS. Labs were grossly unremarkable. CT head with no acute intracranial abnormality. REVIEW OF SYSTEMS:  Review of systems not obtained due to patient factors. Past Medical History:   Diagnosis Date    Alzheimer disease ([facility] [address]     [facility]       History reviewed. No pertinent surgical history. Prior to Admission medications    Medication Sig Start Date End Date Taking?  Authorizing Provider   ergocalciferol (ERGOCALCIFEROL) 1,250 mcg (50,000 unit) capsule Take 1 Capsule by mouth every seven (7) days. 3/26/23   Brook Joe MD   atorvastatin (LIPITOR) 10 mg tablet Take  by mouth daily. Debra Vee MD   donepeziL (ARICEPT) 5 mg tablet Take  by mouth nightly. Debra Vee MD   lisinopriL (PRINIVIL, ZESTRIL) 10 mg tablet Take  by mouth daily. Debra Vee MD   metoprolol succinate (TOPROL-XL) 25 mg XL tablet Take  by mouth daily. Debra Vee MD   QUEtiapine (SEROquel) 50 mg tablet Take 50 mg by mouth two (2) times a day. Debra Vee MD   saw palmetto 450 mg cap Take  by mouth. Debra Vee MD   rivaroxaban (XARELTO) 20 mg tab tablet Take  by mouth daily. Debra Vee MD   ibuprofen (MOTRIN) 800 mg tablet Take  by mouth. Debra Vee MD   acetaminophen (TYLENOL) 500 mg tablet Take 2 Tablets by mouth every six (6) hours as needed for Pain or Fever. 12/11/22   Brittany Herrmann MD   lidocaine (Lido Rafael) 4 % patch Apply as needed every 12 hours for pain. 12/11/22   Brittany Herrmann MD   diclofenac (Voltaren) 1 % gel Apply 4 g to affected area four (4) times daily. 12/11/22   Brittany Herrmann MD     No Known Allergies   History reviewed. No pertinent family history. Social History:  reports that he has never smoked. He has never been exposed to tobacco smoke. He has never used smokeless tobacco. He reports that he does not currently use alcohol. He reports that he does not use drugs.    Social Determinants of Health     Tobacco Use: Low Risk     Smoking Tobacco Use: Never    Smokeless Tobacco Use: Never    Passive Exposure: Never   Alcohol Use: Not on file   Financial Resource Strain: Unknown    Difficulty of Paying Living Expenses: Patient refused   Food Insecurity: Unknown    Worried About Running Out of Food in the Last Year: Patient refused    Ran Out of Food in the Last Year: Patient refused   Transportation Needs: Not on file   Physical Activity: Insufficiently Active    Days of Exercise per Week: 2 days    Minutes of Exercise per Session: 10 min   Stress: Not on file   Social Connections: Not on file   Intimate Partner Violence: Not At Risk    Fear of Current or Ex-Partner: No    Emotionally Abused: No    Physically Abused: No    Sexually Abused: No   Depression: Not at risk    PHQ-2 Score: 0   Housing Stability: Not on file        Medications were reconciled to the best of my ability given all available resources at the time of admission. Route is PO if not otherwise noted. Family and social history were personally reviewed, all pertinent and relevant details are outlined as above. Objective:   Visit Vitals  /69 (BP 1 Location: Left upper arm, BP Patient Position: At rest)   Pulse (!) 55   Temp 97.4 °F (36.3 °C)   Resp 12   Wt (P) 72.3 kg (159 lb 8 oz)   SpO2 98%   BMI (P) 22.89 kg/m²           PHYSICAL EXAM:   General: Alert x oriented x 3, awake, no acute distress,   HEENT: PEERL, EOMI, moist mucus membranes  Neck: Supple, no JVD, no meningeal signs  Chest: Clear to auscultation bilaterally   CVS: bradycardia, irregular-irregualr S1 S2 heard, no murmurs/rubs/gallops  Abd: Soft, non-tender, non-distended  Ext: No clubbing, no cyanosis, no edema  Neuro/Psych: Pleasant mood and affect, CN 2-12 grossly intact, sensory grossly within normal limit, Strength 5/5 in all extremities.   Cap refill: Brisk, less than 3 seconds  Pulses: 2+ radial pulses  Skin: Warm, dry, without rashes or lesions    Data Review:   I have independently reviewed and interpreted patient's lab and all other diagnostic data    Abnormal Labs Reviewed   METABOLIC PANEL, COMPREHENSIVE - Abnormal; Notable for the following components:       Result Value    BUN 24 (*)     BUN/Creatinine ratio 29 (*)     Bilirubin, total 1.1 (*)     All other components within normal limits   MAGNESIUM - Abnormal; Notable for the following components:    Magnesium 2.6 (*)     All other components within normal limits       All Micro Results       None            IMAGING:   XR CHEST PORT   Final Result   No acute cardiopulmonary process. CT HEAD WO CONT   Final Result   No evidence of acute intracranial abnormality. Chronic changes include moderate diffuse brain atrophy, left ganglial capsular   old lacunar infarct and microangiopathic white matter disease. ECG/ECHO:  No results found for this or any previous visit. Notes reviewed from all clinical/nonclinical/nursing services involved in patient's clinical care. Care coordination discussions were held with appropriate clinical/nonclinical/ nursing providers based on care coordination needs. Assessment:   Given the patient's current clinical presentation, there is a high level of concern for decompensation if discharged from the emergency department. Complex decision making was performed, which includes reviewing the patient's available past medical records, laboratory results, and imaging studies. Active Problems:    Acute delirium (3/26/2023)        Plan:     #Advanced Dementia  -continue seroquel at 50mg BID  -psych consult for help with medication management for behavioral disturbance    #Bradycardia  #Atrial Fibrillation  -rec'd atropine for bradycardia to 48  -TSH normal at 1.8  -hold home metoprolol  -continuous telemetry, if bradycardia is symptomatic or high grade HB noted on telemetry, cardiology consultation  -continue xarelto    #Left chest pain  -EKG with no ischemic change, and trop normal times two, port CXR without acute cardiopulmonary abnormality  -no bruising or gross joint deformity or joint pain noted on exam  -left shoulder and rib series XR  -pain control with lidocaine patch if needed    #HTN  -continue lisinopril    #dyslipidemia  -continue home statin    DIET: No diet orders on file   ISOLATION PRECAUTIONS: There are currently no Active Isolations  CODE STATUS: No Order   DVT PROPHYLAXIS: Xarelto  ANTICIPATED DISCHARGE: 24-48 hours.   ANTICIPATED DISPOSITION: LTC  EMERGENCY CONTACT/SURROGATE DECISION MAKER: Gabe Schwartz (daughter)    Signed By: Isidro Moore MD     March 27, 2023         Please note that this dictation may have been completed with Dragon, the computer voice recognition software. Quite often unanticipated grammatical, syntax, homophones, and other interpretive errors are inadvertently transcribed by the computer software. Please disregard these errors. Please excuse any errors that have escaped final proofreading.

## 2023-03-27 NOTE — PROGRESS NOTES
Physician Progress Note      Dez Acosta  CSN #:                  [id number]  :                       1940  ADMIT DATE:       3/26/2023 5:21 PM  Alejo Morley DATE:        3/27/2023 4:48 PM  RESPONDING  PROVIDER #:        Patra Bosworth MD Karon Novel MD          QUERY TEXT:    Patient admitted with Advanced Dementia, noted to have atrial fibrillation and is maintained on Xarelto. If possible, please document in progress notes and discharge summary if you are evaluating and/or treating any of the following: The medical record reflects the following:  Risk Factors: Octogenarian, Atrial Fibrillation, HTN    Clinical Indicators:  3/27 H&P  #Atrial Fibrillation  -rec'd atropine for bradycardia to 48  -TSH normal at 1.8  -hold home metoprolol  -continuous telemetry, if bradycardia is symptomatic or high grade HB noted on telemetry, cardiology consultation  -continue xarelto    Treatment: Telemetry level of care, VS per unit routine with continuous bedside cardiac monitoring, Xarelto 20mg tablet PO Daily, lisinopril 10mg tablet PO Daily, lipitor 10mg tablet PO Daily    Thank you,  Carolina Roberts, BSN, RN, CCRN-K, CRCR  J[employer]ber: [phone]  I am also available via PS. Options provided:  -- Secondary hypercoagulable state in a patient with atrial fibrillation  -- Other - I will add my own diagnosis  -- Disagree - Not applicable / Not valid  -- Disagree - Clinically unable to determine / Unknown  -- Refer to Clinical Documentation Reviewer    PROVIDER RESPONSE TEXT:    This patient has secondary hypercoagulable state in a patient with atrial fibrillation.     Query created by: Carmen Seals on 3/27/2023 3:12 PM      Electronically signed by:  Patra Bosworth MD Karon Novel MD 3/27/2023 5:44 PM

## 2023-03-27 NOTE — ROUTINE PROCESS
Emergency Room Outgoing Transfer Nursing Note      Verbal and/or Written report given to Juliette Ya RN by Lakshmi Forbes RN on Idella Shone a 82 y.o. male who was admitted on 3/26/2023  5:21 PM and being transferred for routine progression of care. Report consisted of the following information SBAR, Kardex, MAR, and Recent Results and the information was reviewed with the receiving nurse. Code Status: No Order      Chief Complaint: Altered mental status      Admit Diagnosis: Acute delirium [R41.0]      Admitting Provider: Chucky Najera MD      Surgery: * No surgery found *       Infections: No current active infections      Allergies: Patient has no known allergies.       Current diet: No diet orders on file      Lines:   Peripheral IV 03/26/23 Left Antecubital (Active)                Vital Signs:   Patient Vitals for the past 12 hrs:   Temp Pulse Resp BP SpO2   03/26/23 2300 -- (!) 51 17 (!) 103/55 --   03/26/23 2250 -- 69 11 (!) 109/53 --   03/26/23 2130 -- 76 15 121/78 97 %   03/26/23 2100 -- 84 14 121/73 97 %   03/26/23 2030 -- (!) 56 15 (!) 131/93 96 %   03/26/23 2003 -- (!) 55 12 (!) 140/76 99 %   03/26/23 1948 -- 62 14 123/65 99 %   03/26/23 1933 -- 61 12 129/71 100 %   03/26/23 1918 -- (!) 50 18 135/68 98 %   03/26/23 1901 -- (!) 55 13 (!) 154/68 98 %   03/26/23 1846 -- (!) 48 12 117/63 98 %   03/26/23 1831 -- (!) 50 12 (!) 134/58 97 %   03/26/23 1816 -- (!) 52 15 124/60 97 %   03/26/23 1734 97.4 °F (36.3 °C) 62 19 124/68 96 %           Intake & Output:   No intake or output data in the 24 hours ending 03/27/23 0001       Laboratory Results:     Recent Results (from the past 12 hour(s))   CBC WITH AUTOMATED DIFF    Collection Time: 03/26/23  5:38 PM   Result Value Ref Range    WBC 6.0 4.1 - 11.1 K/uL    RBC 4.71 4.10 - 5.70 M/uL    HGB 14.2 12.1 - 17.0 g/dL    HCT 43.0 36.6 - 50.3 %    MCV 91.3 80.0 - 99.0 FL    MCH 30.1 26.0 - 34.0 PG    MCHC 33.0 30.0 - 36.5 g/dL    RDW 12.1 11.5 - 14.5 %    PLATELET 403 386 - 090 K/uL    MPV 11.5 8.9 - 12.9 FL    NRBC 0.0 0  WBC    ABSOLUTE NRBC 0.00 0.00 - 0.01 K/uL    NEUTROPHILS 55 32 - 75 %    LYMPHOCYTES 30 12 - 49 %    MONOCYTES 9 5 - 13 %    EOSINOPHILS 5 0 - 7 %    BASOPHILS 1 0 - 1 %    IMMATURE GRANULOCYTES 0 0.0 - 0.5 %    ABS. NEUTROPHILS 3.3 1.8 - 8.0 K/UL    ABS. LYMPHOCYTES 1.8 0.8 - 3.5 K/UL    ABS. MONOCYTES 0.5 0.0 - 1.0 K/UL    ABS. EOSINOPHILS 0.3 0.0 - 0.4 K/UL    ABS. BASOPHILS 0.1 0.0 - 0.1 K/UL    ABS. IMM. GRANS. 0.0 0.00 - 0.04 K/UL    DF AUTOMATED     METABOLIC PANEL, COMPREHENSIVE    Collection Time: 03/26/23  5:38 PM   Result Value Ref Range    Sodium 145 136 - 145 mmol/L    Potassium 3.8 3.5 - 5.1 mmol/L    Chloride 106 97 - 108 mmol/L    CO2 31 21 - 32 mmol/L    Anion gap 8 5 - 15 mmol/L    Glucose 88 65 - 100 mg/dL    BUN 24 (H) 6 - 20 MG/DL    Creatinine 0.84 0.70 - 1.30 MG/DL    BUN/Creatinine ratio 29 (H) 12 - 20      eGFR >60 >60 ml/min/1.73m2    Calcium 8.8 8.5 - 10.1 MG/DL    Bilirubin, total 1.1 (H) 0.2 - 1.0 MG/DL    ALT (SGPT) 24 12 - 78 U/L    AST (SGOT) 17 15 - 37 U/L    Alk.  phosphatase 107 45 - 117 U/L    Protein, total 6.9 6.4 - 8.2 g/dL    Albumin 3.9 3.5 - 5.0 g/dL    Globulin 3.0 2.0 - 4.0 g/dL    A-G Ratio 1.3 1.1 - 2.2     MAGNESIUM    Collection Time: 03/26/23  5:38 PM   Result Value Ref Range    Magnesium 2.6 (H) 1.6 - 2.4 mg/dL   TSH 3RD GENERATION    Collection Time: 03/26/23  5:38 PM   Result Value Ref Range    TSH 1.80 0.36 - 3.74 uIU/mL   TROPONIN-HIGH SENSITIVITY    Collection Time: 03/26/23  5:38 PM   Result Value Ref Range    Troponin-High Sensitivity 8 0 - 76 ng/L   NT-PRO BNP    Collection Time: 03/26/23  5:38 PM   Result Value Ref Range    NT pro- 0 - 450 PG/ML   TROPONIN-HIGH SENSITIVITY    Collection Time: 03/26/23  8:02 PM   Result Value Ref Range    Troponin-High Sensitivity 9 0 - 76 ng/L           Patient transported with : [employer] for questions and clarifications were provided.          Sadie Gutierrez RN, JASS, BSN, [facility]     3/27/2023, 12:01 AM

## 2023-03-27 NOTE — CONSULTS
PSYCHIATRY CONSULT NOTE    REASON FOR CONSULT: dementia with behavioral disturbance, help with medication management. HISTORY OF PRESENTING COMPLAINT:  Lyn Reeves is a 82 y.o. WHITE/NON- male who is currently admitted to the medical floor at [facility]. Patient is admitted for bradycardia and atrial fibrillation. He is a resident of a memory care facility. He has a hx of advanced dementia and has been aggressive towards staffs at the facility, wandering into other patient's rooms. Patient is in bed, alert, calm and cooperative. He reports his mood is alright. He is a poor historian and unable to provide history. He denies suicidal/homicidal thoughts and AV hallucinations. Daughter is at bedside and confirms the above behaviors. She reports patient is on Seroquel and the dose was increased in February to a 100 mg daily however patient became more restless to a point were he has been unable to feed himself especially in the morning. She is requesting that the morning dose of 25 mg be divided into two so he can get it morning and afternoon and an extended release dosage at bedtime to proactively keep his mood stable throughout the day. Daughter denies patient does not have a hx of mental health disorder. She states that patient is eating and sleeping fairly. She states that patient has not exhibited any behaviors in the hospital probably because she is present with him. PAST PSYCHIATRIC HISTORY and SUBSTANCE ABUSE HISTORY:  Daughter denies    PAST MEDICAL HISTORY:    Please see H&P for details. Past Medical History:   Diagnosis Date    Alzheimer disease ([facility]     Dementia ([facility]      Prior to Admission medications    Medication Sig Start Date End Date Taking? Authorizing Provider   ergocalciferol (ERGOCALCIFEROL) 1,250 mcg (50,000 unit) capsule Take 1 Capsule by mouth every seven (7) days. 3/26/23   Beena Kwan MD   atorvastatin (LIPITOR) 10 mg tablet Take  by mouth daily. Debra Vee MD   donepeziL (ARICEPT) 5 mg tablet Take  by mouth nightly. Debra Vee MD   lisinopriL (PRINIVIL, ZESTRIL) 10 mg tablet Take  by mouth daily. Debra Vee MD   metoprolol succinate (TOPROL-XL) 25 mg XL tablet Take  by mouth daily. Debra Vee MD   QUEtiapine (SEROquel) 50 mg tablet Take 50 mg by mouth two (2) times a day. Debra Vee MD   saw palmetto 450 mg cap Take  by mouth. Debra Vee MD   rivaroxaban (XARELTO) 20 mg tab tablet Take  by mouth daily. Debra Vee MD   ibuprofen (MOTRIN) 800 mg tablet Take  by mouth. Debra Vee MD   acetaminophen (TYLENOL) 500 mg tablet Take 2 Tablets by mouth every six (6) hours as needed for Pain or Fever. 12/11/22   Brittany Herrmann MD   lidocaine (Lido Rafael) 4 % patch Apply as needed every 12 hours for pain. 12/11/22   Brittany Herrmann MD   diclofenac (Voltaren) 1 % gel Apply 4 g to affected area four (4) times daily. 12/11/22   Brittany Herrmann MD     Vitals:    03/27/23 0159 03/27/23 0400 03/27/23 0600 03/27/23 0851   BP:    126/71   Pulse: (!) 52 63 73 84   Resp:    12   Temp:    98 °F (36.7 °C)   SpO2:    96%   Weight:         Lab Results   Component Value Date/Time    WBC 6.0 03/26/2023 05:38 PM    HGB 14.2 03/26/2023 05:38 PM    HCT 43.0 03/26/2023 05:38 PM    PLATELET 334 22/69/8657 05:38 PM    MCV 91.3 03/26/2023 05:38 PM     Lab Results   Component Value Date/Time    Sodium 145 03/26/2023 05:38 PM    Potassium 3.8 03/26/2023 05:38 PM    Chloride 106 03/26/2023 05:38 PM    CO2 31 03/26/2023 05:38 PM    Anion gap 8 03/26/2023 05:38 PM    Glucose 88 03/26/2023 05:38 PM    BUN 24 (H) 03/26/2023 05:38 PM    Creatinine 0.84 03/26/2023 05:38 PM    BUN/Creatinine ratio 29 (H) 03/26/2023 05:38 PM    Calcium 8.8 03/26/2023 05:38 PM    Bilirubin, total 1.1 (H) 03/26/2023 05:38 PM    Alk.  phosphatase 107 03/26/2023 05:38 PM    Protein, total 6.9 03/26/2023 05:38 PM    Albumin 3.9 03/26/2023 05:38 PM    Globulin 3.0 03/26/2023 05:38 PM A-G Ratio 1.3 03/26/2023 05:38 PM    ALT (SGPT) 24 03/26/2023 05:38 PM    AST (SGOT) 17 03/26/2023 05:38 PM     No results found for: VALF2, VALAC, VALP, VALPR, DS6, CRBAM, CRBAMP, CARB2, XCRBAM  No results found for: LITHM  RADIOLOGY REPORTS:(reviewed/updated 3/27/2023)  CT HEAD WO CONT    Result Date: 3/26/2023  EXAM: CT HEAD WO CONT INDICATION: AMS COMPARISON: 1/31/2023 CT head. CONTRAST: None. TECHNIQUE: Unenhanced CT of the head was performed using 5 mm images. Brain and bone windows were generated. Coronal and sagittal reformats. CT dose reduction was achieved through use of a standardized protocol tailored for this examination and automatic exposure control for dose modulation. FINDINGS: Moderate diffuse parenchymal volume loss with exvacuodilatation of the ventricles and extraventricular CSF spaces. . Patchy periventricular and deep white matter ill-defined hypodensities, nonspecific and likely microangiopathic white matter disease. Left periventricular corona radiata/basal ganglia old lacunar infarct. There is no intracranial hemorrhage, extra-axial collection, or mass effect. The basilar cisterns are open. No CT evidence of acute infarct. The bone windows demonstrate no abnormalities. The visualized portions of the paranasal sinuses and mastoid air cells are clear. Dental amalgam associated metallic artifact. No evidence of acute intracranial abnormality. Chronic changes include moderate diffuse brain atrophy, left ganglial capsular old lacunar infarct and microangiopathic white matter disease. CT HEAD WO CONT    Result Date: 1/31/2023  HEAD CT WITHOUT CONTRAST: 1/31/2023 10:54 AM INDICATION: fall COMPARISON: 1/2/2023. PROCEDURE: Axial images of the head were obtained without contrast. Coronal and sagittal reformats were performed. CT dose reduction was achieved through use of a standardized protocol tailored for this examination and automatic exposure control for dose modulation.  FINDINGS: There were small, old, lacunar infarctions in the left caudate body and left lenticular nuclei. The ventricles and sulci are appropriate in size and configuration for age. No new, ill-defined loss of gray-white differentiation to suggest late acute or early subacute infarction. No mass effect or intracranial hemorrhage. No acute intracranial abnormality. CT HEAD WO CONT    Result Date: 1/2/2023  EXAM: CT HEAD WO CONT INDICATION: Status post fall, dizziness COMPARISON: None. CONTRAST: None. TECHNIQUE: Unenhanced CT of the head was performed using 5 mm images. Brain and bone windows were generated. Coronal and sagittal reformats. CT dose reduction was achieved through use of a standardized protocol tailored for this examination and automatic exposure control for dose modulation. FINDINGS: The ventricles and sulci are normal in size, shape and configuration. Mild small vessel ischemic changes are seen in the periventricular white matter. There is no intracranial hemorrhage, extra-axial collection, or mass effect. The basilar cisterns are open. No CT evidence of acute infarct. The bone windows demonstrate no abnormalities. The visualized portions of the paranasal sinuses and mastoid air cells are clear. Vascular calcification is noted. No acute abnormality. CT MAXILLOFACIAL WO CONT    Result Date: 1/31/2023  MAXILLOFACIAL CT  WITHOUT CONTRAST: 1/31/2023 10:54 AM INDICATION: Fall. COMPARISON: None. PROCEDURE: CT was performed from the mandible through the frontal region without contrast. Sagittal and coronal reconstructions were performed. CT dose reduction was achieved through use of a standardized protocol tailored for this examination and automatic exposure control for dose modulation. FINDINGS: No facial fracture. The sinuses are clear. The orbits are normal.     Normal facial bones.      CT SPINE CERV WO CONT    Result Date: 1/31/2023  EXAM:  CT CERVICAL SPINE WITHOUT CONTRAST INDICATION:   fall COMPARISON: None. TECHNIQUE: CT of the cervical spine was performed without intravenous contrast administration. Sagittal and coronal reconstructions were generated. CT dose reduction was achieved through use of a standardized protocol tailored for this examination and automatic exposure control for dose modulation. FINDINGS: No fracture or dislocation. Cervical alignment is normal. The paravertebral soft tissues are normal. Degenerative disease causes the following stenoses: C2-C3:  Severe left neural foraminal stenosis. C3-C4:  Severe left neural foraminal stenosis. Mild canal stenosis. C4-C5:  Severe bilateral neural foraminal stenosis. Moderate canal stenosis. C5-C6:  Severe bilateral neural foraminal stenosis. Moderate canal stenosis. C6-C7:  Bilateral neural foraminal stenosis. Mild canal stenosis. C7-T1:  No stenosis. T1-T2: No stenosis. T2-T3: No stenosis. T3-T4: Partially imaged. No visible stenosis. No fracture. Degenerative stenoses as described above. XR CHEST PORT    Result Date: 3/26/2023  EXAM:  XR CHEST PORT INDICATION:   Chest pain COMPARISON: None. FINDINGS: AP radiograph of the chest was obtained. No evidence of focal consolidation. No pleural effusion or pneumothorax. Heart, jay jay, mediastinum are within normal limits. No acute osseous abnormalities. Degenerative changes of the thoracic spine and bilateral acromioclavicular joints. No acute cardiopulmonary process. No results found for: [address] , [id number], [id number], [id number], PREGU, POCHCG, MHCGN, HCGQR, THCGA1, SHCG, HCGN, HCGSERUM, HCGURQLPOC    PSYCHOSOCIAL HISTORY:Patient is a resident of a memory care facility. He is  and has a daughter. MENTAL STATUS EXAM:    General appearance:  moderately  groomed, psychomotor activity is wnl  Eye contact: Avoids eye contact  Speech: Spontaneous, soft, decreased output. Affect : mood congruent  Mood: \"good \"  Thought Process: confused, poverty of content  Perception: Denies AH or VH. Thought Content: denies SI/HI or Plan  Insight: Poor  Judgement: poor  Cognition: Impaired     ASSESSMENT AND PLAN:  Pradip Valerio meets criteria for a diagnosis of  dementia with behavioral disturbance . At the daughter's request, consider starting seroquel 12.5mg in the morning and afternoon and seroquel XR 50mg at bedtime for mood. Patient is calm during the assessment. Psych admission is not recommended. Thank your your consult. Please feel free to consult us again as needed.

## 2023-05-04 ENCOUNTER — APPOINTMENT (OUTPATIENT)
Dept: CT IMAGING | Age: 83
End: 2023-05-04
Attending: EMERGENCY MEDICINE
Payer: MEDICARE

## 2023-05-04 ENCOUNTER — HOSPITAL ENCOUNTER (EMERGENCY)
Age: 83
Discharge: HOME | End: 2023-05-04
Attending: EMERGENCY MEDICINE
Payer: MEDICARE

## 2023-05-04 ENCOUNTER — APPOINTMENT (OUTPATIENT)
Dept: GENERAL RADIOLOGY | Age: 83
End: 2023-05-04
Attending: EMERGENCY MEDICINE
Payer: MEDICARE

## 2023-05-04 VITALS
TEMPERATURE: 97.5 F | SYSTOLIC BLOOD PRESSURE: 110 MMHG | OXYGEN SATURATION: 95 % | DIASTOLIC BLOOD PRESSURE: 75 MMHG | RESPIRATION RATE: 16 BRPM | HEART RATE: 74 BPM
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
## 2023-05-04 DIAGNOSIS — S63.289A DISLOCATION OF PROXIMAL INTERPHALANGEAL JOINT OF FINGER, INITIAL ENCOUNTER: ICD-10-CM

## 2023-05-04 LAB
ALBUMIN SERPL-MCNC: 4.1 G/DL (ref 3.5–5)
ALBUMIN/GLOB SERPL: 1.4 (ref 1.1–2.2)
ALP SERPL-CCNC: 108 U/L (ref 45–117)
ALT SERPL-CCNC: 21 U/L (ref 12–78)
ANION GAP SERPL CALC-SCNC: 12 MMOL/L (ref 5–15)
AST SERPL-CCNC: 26 U/L (ref 15–37)
BASOPHILS # BLD: 0 K/UL (ref 0–0.1)
BASOPHILS NFR BLD: 1 % (ref 0–1)
BILIRUB SERPL-MCNC: 1.3 MG/DL (ref 0.2–1)
BUN SERPL-MCNC: 24 MG/DL (ref 6–20)
BUN/CREAT SERPL: 30 (ref 12–20)
CALCIUM SERPL-MCNC: 9.1 MG/DL (ref 8.5–10.1)
CHLORIDE SERPL-SCNC: 104 MMOL/L (ref 97–108)
CK SERPL-CCNC: 46 U/L (ref 39–308)
CO2 SERPL-SCNC: 27 MMOL/L (ref 21–32)
CREAT SERPL-MCNC: 0.79 MG/DL (ref 0.7–1.3)
DIFFERENTIAL METHOD BLD: ABNORMAL
EOSINOPHIL # BLD: 0.2 K/UL (ref 0–0.4)
EOSINOPHIL NFR BLD: 3 % (ref 0–7)
ERYTHROCYTE [DISTWIDTH] IN BLOOD BY AUTOMATED COUNT: 12.2 % (ref 11.5–14.5)
GLOBULIN SER CALC-MCNC: 3 G/DL (ref 2–4)
GLUCOSE SERPL-MCNC: 98 MG/DL (ref 65–100)
HCT VFR BLD AUTO: 43 % (ref 36.6–50.3)
HGB BLD-MCNC: 14.3 G/DL (ref 12.1–17)
IMM GRANULOCYTES # BLD AUTO: 0 K/UL (ref 0–0.04)
IMM GRANULOCYTES NFR BLD AUTO: 0 % (ref 0–0.5)
LYMPHOCYTES # BLD: 1.7 K/UL (ref 0.8–3.5)
LYMPHOCYTES NFR BLD: 26 % (ref 12–49)
MCH RBC QN AUTO: 29.7 PG (ref 26–34)
MCHC RBC AUTO-ENTMCNC: 33.3 G/DL (ref 30–36.5)
MCV RBC AUTO: 89.2 FL (ref 80–99)
MONOCYTES # BLD: 0.5 K/UL (ref 0–1)
MONOCYTES NFR BLD: 8 % (ref 5–13)
NEUTS SEG # BLD: 4 K/UL (ref 1.8–8)
NEUTS SEG NFR BLD: 62 % (ref 32–75)
NRBC # BLD: 0 K/UL (ref 0–0.01)
NRBC BLD-RTO: 0 PER 100 WBC
PLATELET # BLD AUTO: 148 K/UL (ref 150–400)
PMV BLD AUTO: 11.3 FL (ref 8.9–12.9)
POTASSIUM SERPL-SCNC: 3.5 MMOL/L (ref 3.5–5.1)
PROT SERPL-MCNC: 7.1 G/DL (ref 6.4–8.2)
RBC # BLD AUTO: 4.82 M/UL (ref 4.1–5.7)
SODIUM SERPL-SCNC: 143 MMOL/L (ref 136–145)
WBC # BLD AUTO: 6.5 K/UL (ref 4.1–11.1)

## 2023-05-04 PROCEDURE — 74011250636 HC RX REV CODE- 250/636: Performed by: EMERGENCY MEDICINE

## 2023-05-04 PROCEDURE — 99284 EMERGENCY DEPT VISIT MOD MDM: CPT

## 2023-05-04 PROCEDURE — 75810000282 HC REDUCTION OF RECTAL PROLAPSE

## 2023-05-04 PROCEDURE — 70450 CT HEAD/BRAIN W/O DYE: CPT

## 2023-05-04 PROCEDURE — 73130 X-RAY EXAM OF HAND: CPT

## 2023-05-04 PROCEDURE — 80053 COMPREHEN METABOLIC PANEL: CPT

## 2023-05-04 PROCEDURE — 85025 COMPLETE CBC W/AUTO DIFF WBC: CPT

## 2023-05-04 PROCEDURE — 72170 X-RAY EXAM OF PELVIS: CPT

## 2023-05-04 PROCEDURE — 75810000301 HC ER LEVEL 1 CLOSED TREATMNT FRACTURE/DISLOCATION

## 2023-05-04 PROCEDURE — 36415 COLL VENOUS BLD VENIPUNCTURE: CPT

## 2023-05-04 PROCEDURE — 72125 CT NECK SPINE W/O DYE: CPT

## 2023-05-04 PROCEDURE — 96372 THER/PROPH/DIAG INJ SC/IM: CPT

## 2023-05-04 PROCEDURE — 82550 ASSAY OF CK (CPK): CPT

## 2023-05-04 RX ORDER — MORPHINE SULFATE 4 MG/ML
4 INJECTION INTRAVENOUS ONCE
Status: COMPLETED | OUTPATIENT
Start: 2023-05-04 | End: 2023-05-04

## 2023-05-04 RX ORDER — ACETAMINOPHEN 325 MG/1
650 TABLET ORAL
Qty: 20 TABLET | Refills: 0 | OUTPATIENT
Start: 2023-05-04 | End: 2023-05-05

## 2023-05-04 RX ADMIN — MORPHINE SULFATE 4 MG: 4 INJECTION, SOLUTION INTRAMUSCULAR; INTRAVENOUS at 10:22

## 2023-05-05 ENCOUNTER — APPOINTMENT (OUTPATIENT)
Dept: CT IMAGING | Age: 83
End: 2023-05-05
Attending: EMERGENCY MEDICINE
Payer: MEDICARE

## 2023-05-05 ENCOUNTER — HOSPITAL ENCOUNTER (EMERGENCY)
Age: 83
Discharge: HOME | End: 2023-05-05
Attending: EMERGENCY MEDICINE
Payer: MEDICARE

## 2023-05-05 VITALS
BODY MASS INDEX: 24.26 KG/M2 | HEART RATE: 89 BPM | OXYGEN SATURATION: 98 % | DIASTOLIC BLOOD PRESSURE: 49 MMHG | TEMPERATURE: 98.4 F | WEIGHT: 169.09 LBS | RESPIRATION RATE: 16 BRPM | SYSTOLIC BLOOD PRESSURE: 111 MMHG

## 2023-05-05 DIAGNOSIS — F03.911 DEMENTIA WITH AGITATION, UNSPECIFIED DEMENTIA SEVERITY, UNSPECIFIED DEMENTIA TYPE (HCC): Primary | ICD-10-CM

## 2023-05-05 DIAGNOSIS — R29.6 RECURRENT FALLS: ICD-10-CM

## 2023-05-05 DIAGNOSIS — R11.10 VOMITING, UNSPECIFIED VOMITING TYPE, UNSPECIFIED WHETHER NAUSEA PRESENT: Primary | ICD-10-CM

## 2023-05-05 DIAGNOSIS — R10.817 GENERALIZED ABDOMINAL TENDERNESS WITHOUT REBOUND TENDERNESS: ICD-10-CM

## 2023-05-05 LAB
ALBUMIN SERPL-MCNC: 4.2 G/DL (ref 3.5–5)
ALBUMIN/GLOB SERPL: 1.3 (ref 1.1–2.2)
ALP SERPL-CCNC: 133 U/L (ref 45–117)
ALT SERPL-CCNC: 20 U/L (ref 12–78)
ANION GAP SERPL CALC-SCNC: 10 MMOL/L (ref 5–15)
AST SERPL-CCNC: 21 U/L (ref 15–37)
BASOPHILS # BLD: 0 K/UL (ref 0–0.1)
BASOPHILS NFR BLD: 0 % (ref 0–1)
BILIRUB SERPL-MCNC: 0.9 MG/DL (ref 0.2–1)
BUN SERPL-MCNC: 25 MG/DL (ref 6–20)
BUN/CREAT SERPL: 30 (ref 12–20)
CALCIUM SERPL-MCNC: 9.3 MG/DL (ref 8.5–10.1)
CHLORIDE SERPL-SCNC: 103 MMOL/L (ref 97–108)
CO2 SERPL-SCNC: 31 MMOL/L (ref 21–32)
CREAT SERPL-MCNC: 0.83 MG/DL (ref 0.7–1.3)
DIFFERENTIAL METHOD BLD: ABNORMAL
EOSINOPHIL # BLD: 0 K/UL (ref 0–0.4)
EOSINOPHIL NFR BLD: 0 % (ref 0–7)
ERYTHROCYTE [DISTWIDTH] IN BLOOD BY AUTOMATED COUNT: 12.2 % (ref 11.5–14.5)
GLOBULIN SER CALC-MCNC: 3.2 G/DL (ref 2–4)
GLUCOSE SERPL-MCNC: 128 MG/DL (ref 65–100)
HCT VFR BLD AUTO: 44.8 % (ref 36.6–50.3)
HGB BLD-MCNC: 14.9 G/DL (ref 12.1–17)
IMM GRANULOCYTES # BLD AUTO: 0 K/UL (ref 0–0.04)
IMM GRANULOCYTES NFR BLD AUTO: 0 % (ref 0–0.5)
LIPASE SERPL-CCNC: 72 U/L (ref 73–393)
LYMPHOCYTES # BLD: 1.1 K/UL (ref 0.8–3.5)
LYMPHOCYTES NFR BLD: 8 % (ref 12–49)
MCH RBC QN AUTO: 30 PG (ref 26–34)
MCHC RBC AUTO-ENTMCNC: 33.3 G/DL (ref 30–36.5)
MCV RBC AUTO: 90.1 FL (ref 80–99)
MONOCYTES # BLD: 0.6 K/UL (ref 0–1)
MONOCYTES NFR BLD: 4 % (ref 5–13)
NEUTS SEG # BLD: 11.6 K/UL (ref 1.8–8)
NEUTS SEG NFR BLD: 88 % (ref 32–75)
NRBC # BLD: 0 K/UL (ref 0–0.01)
NRBC BLD-RTO: 0 PER 100 WBC
PLATELET # BLD AUTO: 163 K/UL (ref 150–400)
PMV BLD AUTO: 11.3 FL (ref 8.9–12.9)
POTASSIUM SERPL-SCNC: 3.8 MMOL/L (ref 3.5–5.1)
PROT SERPL-MCNC: 7.4 G/DL (ref 6.4–8.2)
RBC # BLD AUTO: 4.97 M/UL (ref 4.1–5.7)
SODIUM SERPL-SCNC: 144 MMOL/L (ref 136–145)
WBC # BLD AUTO: 13.4 K/UL (ref 4.1–11.1)

## 2023-05-05 PROCEDURE — 99285 EMERGENCY DEPT VISIT HI MDM: CPT

## 2023-05-05 PROCEDURE — 74177 CT ABD & PELVIS W/CONTRAST: CPT

## 2023-05-05 PROCEDURE — 80053 COMPREHEN METABOLIC PANEL: CPT

## 2023-05-05 PROCEDURE — 83690 ASSAY OF LIPASE: CPT

## 2023-05-05 PROCEDURE — 96374 THER/PROPH/DIAG INJ IV PUSH: CPT

## 2023-05-05 PROCEDURE — 85025 COMPLETE CBC W/AUTO DIFF WBC: CPT

## 2023-05-05 PROCEDURE — 96375 TX/PRO/DX INJ NEW DRUG ADDON: CPT

## 2023-05-05 PROCEDURE — 74011000636 HC RX REV CODE- 636: Performed by: EMERGENCY MEDICINE

## 2023-05-05 PROCEDURE — 36415 COLL VENOUS BLD VENIPUNCTURE: CPT

## 2023-05-05 PROCEDURE — 74011250636 HC RX REV CODE- 250/636: Performed by: EMERGENCY MEDICINE

## 2023-05-05 RX ORDER — MIRTAZAPINE 15 MG/1
15 TABLET, FILM COATED ORAL
COMMUNITY

## 2023-05-05 RX ORDER — SODIUM CHLORIDE 0.9 % (FLUSH) 0.9 %
5-40 SYRINGE (ML) INJECTION AS NEEDED
Status: DISCONTINUED | OUTPATIENT
Start: 2023-05-05 | End: 2023-05-05 | Stop reason: HOSPADM

## 2023-05-05 RX ORDER — ONDANSETRON 2 MG/ML
4 INJECTION INTRAMUSCULAR; INTRAVENOUS
Status: COMPLETED | OUTPATIENT
Start: 2023-05-05 | End: 2023-05-05

## 2023-05-05 RX ORDER — KETOROLAC TROMETHAMINE 30 MG/ML
15 INJECTION, SOLUTION INTRAMUSCULAR; INTRAVENOUS
Status: COMPLETED | OUTPATIENT
Start: 2023-05-05 | End: 2023-05-05

## 2023-05-05 RX ORDER — LIDOCAINE 50 MG/G
PATCH TOPICAL
Qty: 15 EACH | Refills: 0 | Status: SHIPPED | OUTPATIENT
Start: 2023-05-05

## 2023-05-05 RX ORDER — DICLOFENAC SODIUM 10 MG/G
GEL TOPICAL 4 TIMES DAILY
Qty: 4 G | Refills: 0 | Status: SHIPPED | OUTPATIENT
Start: 2023-05-05

## 2023-05-05 RX ORDER — ACETAMINOPHEN 500 MG
500 TABLET ORAL EVERY 6 HOURS
Qty: 56 TABLET | Refills: 0 | Status: SHIPPED | OUTPATIENT
Start: 2023-05-05 | End: 2023-05-19

## 2023-05-05 RX ORDER — SODIUM CHLORIDE 0.9 % (FLUSH) 0.9 %
5-40 SYRINGE (ML) INJECTION EVERY 8 HOURS
Status: DISCONTINUED | OUTPATIENT
Start: 2023-05-05 | End: 2023-05-05 | Stop reason: HOSPADM

## 2023-05-05 RX ADMIN — IOPAMIDOL 100 ML: 755 INJECTION, SOLUTION INTRAVENOUS at 12:04

## 2023-05-05 RX ADMIN — ONDANSETRON 4 MG: 2 INJECTION INTRAMUSCULAR; INTRAVENOUS at 10:50

## 2023-05-05 RX ADMIN — KETOROLAC TROMETHAMINE 15 MG: 30 INJECTION, SOLUTION INTRAMUSCULAR at 10:51

## 2023-05-05 RX ADMIN — SODIUM CHLORIDE 1000 ML: 9 INJECTION, SOLUTION INTRAVENOUS at 10:50

## 2023-05-09 RX ORDER — QUETIAPINE FUMARATE 25 MG/1
12.5 TABLET, FILM COATED ORAL 2 TIMES DAILY
COMMUNITY
Start: 2023-03-27

## 2023-06-17 ENCOUNTER — HOSPITAL ENCOUNTER (EMERGENCY)
Facility: HOSPITAL | Age: 83
Discharge: HOME | End: 2023-06-17
Attending: EMERGENCY MEDICINE
Payer: MEDICARE

## 2023-06-17 ENCOUNTER — APPOINTMENT (OUTPATIENT)
Facility: HOSPITAL | Age: 83
End: 2023-06-17
Payer: MEDICARE

## 2023-06-17 VITALS
BODY MASS INDEX: 23.72 KG/M2 | WEIGHT: 165.34 LBS | DIASTOLIC BLOOD PRESSURE: 67 MMHG | SYSTOLIC BLOOD PRESSURE: 129 MMHG | RESPIRATION RATE: 16 BRPM | HEART RATE: 82 BPM | TEMPERATURE: 97.5 F | OXYGEN SATURATION: 94 %

## 2023-06-17 DIAGNOSIS — K82.8 GALLBLADDER SLUDGE: ICD-10-CM

## 2023-06-17 DIAGNOSIS — R10.84 GENERALIZED ABDOMINAL PAIN: Primary | ICD-10-CM

## 2023-06-17 LAB
ALBUMIN SERPL-MCNC: 4.2 G/DL (ref 3.5–5)
ALBUMIN/GLOB SERPL: 1.2 (ref 1.1–2.2)
ALP SERPL-CCNC: 144 U/L (ref 45–117)
ALT SERPL-CCNC: 17 U/L (ref 12–78)
ANION GAP SERPL CALC-SCNC: 9 MMOL/L (ref 5–15)
AST SERPL-CCNC: 18 U/L (ref 15–37)
BASOPHILS # BLD: 0 K/UL (ref 0–0.1)
BASOPHILS NFR BLD: 0 % (ref 0–1)
BILIRUB SERPL-MCNC: 0.8 MG/DL (ref 0.2–1)
BUN SERPL-MCNC: 27 MG/DL (ref 6–20)
BUN/CREAT SERPL: 32 (ref 12–20)
CALCIUM SERPL-MCNC: 9.5 MG/DL (ref 8.5–10.1)
CHLORIDE SERPL-SCNC: 105 MMOL/L (ref 97–108)
CO2 SERPL-SCNC: 30 MMOL/L (ref 21–32)
CREAT SERPL-MCNC: 0.84 MG/DL (ref 0.7–1.3)
DIFFERENTIAL METHOD BLD: ABNORMAL
EOSINOPHIL # BLD: 0 K/UL (ref 0–0.4)
EOSINOPHIL NFR BLD: 0 % (ref 0–7)
ERYTHROCYTE [DISTWIDTH] IN BLOOD BY AUTOMATED COUNT: 12.5 % (ref 11.5–14.5)
GLOBULIN SER CALC-MCNC: 3.4 G/DL (ref 2–4)
GLUCOSE SERPL-MCNC: 129 MG/DL (ref 65–100)
HCT VFR BLD AUTO: 45.2 % (ref 36.6–50.3)
HGB BLD-MCNC: 14.8 G/DL (ref 12.1–17)
IMM GRANULOCYTES # BLD AUTO: 0 K/UL (ref 0–0.04)
IMM GRANULOCYTES NFR BLD AUTO: 0 % (ref 0–0.5)
LIPASE SERPL-CCNC: 109 U/L (ref 73–393)
LYMPHOCYTES # BLD: 0.8 K/UL (ref 0.8–3.5)
LYMPHOCYTES NFR BLD: 8 % (ref 12–49)
MCH RBC QN AUTO: 30.3 PG (ref 26–34)
MCHC RBC AUTO-ENTMCNC: 32.7 G/DL (ref 30–36.5)
MCV RBC AUTO: 92.4 FL (ref 80–99)
MONOCYTES # BLD: 0.3 K/UL (ref 0–1)
MONOCYTES NFR BLD: 3 % (ref 5–13)
NEUTS SEG # BLD: 9.6 K/UL (ref 1.8–8)
NEUTS SEG NFR BLD: 89 % (ref 32–75)
NRBC # BLD: 0 K/UL (ref 0–0.01)
NRBC BLD-RTO: 0 PER 100 WBC
PLATELET # BLD AUTO: 157 K/UL (ref 150–400)
PMV BLD AUTO: 11.1 FL (ref 8.9–12.9)
POTASSIUM SERPL-SCNC: 4 MMOL/L (ref 3.5–5.1)
PROT SERPL-MCNC: 7.6 G/DL (ref 6.4–8.2)
RBC # BLD AUTO: 4.89 M/UL (ref 4.1–5.7)
SODIUM SERPL-SCNC: 144 MMOL/L (ref 136–145)
WBC # BLD AUTO: 10.9 K/UL (ref 4.1–11.1)

## 2023-06-17 PROCEDURE — 76705 ECHO EXAM OF ABDOMEN: CPT

## 2023-06-17 PROCEDURE — 36415 COLL VENOUS BLD VENIPUNCTURE: CPT

## 2023-06-17 PROCEDURE — 85025 COMPLETE CBC W/AUTO DIFF WBC: CPT

## 2023-06-17 PROCEDURE — 83690 ASSAY OF LIPASE: CPT

## 2023-06-17 PROCEDURE — 80053 COMPREHEN METABOLIC PANEL: CPT

## 2023-06-17 PROCEDURE — 99284 EMERGENCY DEPT VISIT MOD MDM: CPT

## 2023-06-17 NOTE — ED NOTES
Patient has returned to room from [address] [facility],[address] via stretcher with daughter.       Nicholas Ervin RN  06/17/23 [id number]

## 2023-06-17 NOTE — ED PROVIDER NOTES
mis-transcribed.)    Elise Jeffery MD (electronically signed)  Emergency Attending Physician               Elise Jeffery MD  06/17/23 [id number]

## 2023-06-17 NOTE — ED NOTES
The patient left the Emergency Department ambulatory and alert and in no acute distress. The patient's daughter was encouraged to call or return to the ED for worsening issues or problems and was encouraged to schedule a follow up appointment for continuing care. The patient's daughter verbalized understanding of discharge instructions and all questions were answered. The patient's daughter has no further concerns at this time.          Jasbir Mckay RN  06/17/23 [id number]

## 2023-06-17 NOTE — ED NOTES
Patient transported to [address] [place] [address],[address] via stretcher w cynthia starks and daughter.       Dell Rosenberg RN  06/17/23 6099

## 2023-06-17 NOTE — ED TRIAGE NOTES
Pt arrives with daughter ZAHIDA from [facility] on the [address] [place] [address]. Pt had breakfast and had vomiting following breakfast. Pt c/o ABD pain this morning.

## 2023-06-20 ENCOUNTER — HOSPITAL ENCOUNTER (EMERGENCY)
Facility: HOSPITAL | Age: 83
Discharge: HOME | End: 2023-06-20
Attending: EMERGENCY MEDICINE
Payer: MEDICARE

## 2023-06-20 VITALS
DIASTOLIC BLOOD PRESSURE: 79 MMHG | HEIGHT: 70 IN | OXYGEN SATURATION: 98 % | HEART RATE: 72 BPM | TEMPERATURE: 97.9 F | WEIGHT: 159.83 LBS | BODY MASS INDEX: 22.88 KG/M2 | SYSTOLIC BLOOD PRESSURE: 130 MMHG | RESPIRATION RATE: 16 BRPM

## 2023-06-20 DIAGNOSIS — K80.50 RECURRENT BILIARY COLIC: Primary | ICD-10-CM

## 2023-06-20 PROCEDURE — 99283 EMERGENCY DEPT VISIT LOW MDM: CPT

## 2023-06-20 RX ORDER — IBUPROFEN 600 MG/1
600 TABLET ORAL EVERY 6 HOURS PRN
Qty: 30 TABLET | Refills: 0 | Status: SHIPPED | OUTPATIENT
Start: 2023-06-20

## 2023-06-20 RX ORDER — ACETAMINOPHEN 500 MG
500 TABLET ORAL EVERY 6 HOURS PRN
Qty: 30 TABLET | Refills: 1 | Status: SHIPPED | OUTPATIENT
Start: 2023-06-20 | End: 2023-07-20

## 2023-06-20 RX ORDER — ACETAMINOPHEN 500 MG
1000 TABLET ORAL EVERY 6 HOURS PRN
Qty: 30 TABLET | Refills: 0 | Status: SHIPPED | OUTPATIENT
Start: 2023-06-20

## 2023-06-20 ASSESSMENT — PAIN - FUNCTIONAL ASSESSMENT: PAIN_FUNCTIONAL_ASSESSMENT: PAIN ASSESSMENT IN ADVANCED DEMENTIA (PAINAD)

## 2023-06-20 ASSESSMENT — PAIN SCALES - PAIN ASSESSMENT IN ADVANCED DEMENTIA (PAINAD)
NEGVOCALIZATION: 0
FACIALEXPRESSION: 0
BREATHING: 0
TOTALSCORE: 0
BODYLANGUAGE: 0
CONSOLABILITY: 0

## 2023-06-20 NOTE — ED NOTES
Verbal shift change report given to Jeanna Cramer RN (oncoming nurse) by Silvestre Shah RN (offgoing nurse). Report included the following information ED Encounter Summary and ED SBAR.        Jaylin Medrano RN  06/1940

## 2023-06-20 NOTE — ED NOTES
This RN went in to treatment room to place PIV and lab draws but family member questioned reason for doing so when pt was just seen here a few days ago. Family member stated here mainly for pain control; pt was screaming and riving in pain while at facility and was advised to come here. Family member stated he only has Tylenol ordered and needs a new prescription for increased dosage or change in pain medication to help. Will wait for MD to evaluate pt before going forward with PIV and lab draws.       Celeste Montes RN  06/20/23 9353

## 2023-06-20 NOTE — ED TRIAGE NOTES
Patient was seen in ED on Saturday and diagnosed with gallbladder disease. Since then patient was wondering at his facility and complaining of generalized abd pain. Patient has Hx of Dementia (only knows his name). Patient is ambulatory in Triage. Patient was prescribed Tylenol, but was not taken per facility.

## 2023-06-21 NOTE — ED NOTES
Patient left ED in no acute distress, alert  Family was encourage to come back if symptoms get worse. Family was provided with discharge instructions and prescriptions. All questions were answered. Patient left ambulatory.         Ella Burgos RN  06/20/23 1453

## 2023-06-21 NOTE — ED PROVIDER NOTES
[facility] EMERGENCY CTR  EMERGENCY DEPARTMENT ENCOUNTER      Pt Name: Braxton Swift  MRN: [id number]  Stephgfernestine 1940  Date of evaluation: 6/20/2023  Provider: Jessica Candelario MD    CHIEF COMPLAINT     No chief complaint on file. HISTORY OF PRESENT ILLNESS   (Location/Symptom, Timing/Onset, Context/Setting, Quality, Duration, Modifying Factors, Severity)  Note limiting factors. Patient is a 51-year-old male with history of Alzheimer's disease, atrial fibrillation on Xarelto, dementia who presents with his daughter for abdominal pain. Patient was seen in the ED last week and was diagnosed with biliary sludge. He was sent home on gallbladder low-fat diet. Daughter reports that today she was called by the nursing home saying that her father was pacing and appeared to be in discomfort. She recognized that he was complaining of abdominal pain and brought him to the ED for further management. On arrival she refused labs as patient now is asymptomatic. The history is provided by a relative. Nursing Notes were reviewed. REVIEW OF SYSTEMS    Not Required     Review of Systems    PAST MEDICAL HISTORY     Past Medical History:   Diagnosis Date    Alzheimer disease ([facility] [address]     Atrial fibrillation ([facility] [address]     on Xarelto    Dementia ([facility] [address]        SURGICAL HISTORY     History reviewed. No pertinent surgical history.     CURRENT MEDICATIONS       Discharge Medication List as of 6/20/2023  9:06 PM        CONTINUE these medications which have NOT CHANGED    Details   !! QUEtiapine (SEROQUEL) 25 MG tablet Take 0.5 tablets by mouth 2 times dailyHistorical Med      atorvastatin (LIPITOR) 10 MG tablet Take by mouth dailyHistorical Med      diclofenac sodium (VOLTAREN) 1 % GEL Apply 4 g topically 4 times daily, Topical, 4 TIMES DAILY Starting Sun 12/11/2022, Historical Med      donepezil (ARICEPT) 5 MG tablet Take by mouthHistorical Med      ergocalciferol (ERGOCALCIFEROL) 1.25 MG (66026 UT) capsule Take 1 capsule

## 2023-06-21 NOTE — DISCHARGE INSTRUCTIONS
Please eat only bland non fatty foods which will lower chances of recurrence of biliary colic (gallbladder pain). If in pain/appears uncomfortable, please take tylenol 1000 mg Q6H PRN and motrin 600 mg Q6H as needed. Scheduled an appointment with general surgery to discuss your options. Thank you.

## 2023-06-23 ENCOUNTER — OFFICE VISIT (OUTPATIENT)
Age: 83
End: 2023-06-23
Payer: MEDICARE

## 2023-06-23 VITALS
OXYGEN SATURATION: 96 % | TEMPERATURE: 97.8 F | SYSTOLIC BLOOD PRESSURE: 103 MMHG | BODY MASS INDEX: 22.48 KG/M2 | WEIGHT: 157 LBS | HEIGHT: 70 IN | DIASTOLIC BLOOD PRESSURE: 64 MMHG | HEART RATE: 78 BPM | RESPIRATION RATE: 16 BRPM

## 2023-06-23 DIAGNOSIS — K80.20 SYMPTOMATIC CHOLELITHIASIS: Primary | ICD-10-CM

## 2023-06-23 PROCEDURE — G8427 DOCREV CUR MEDS BY ELIG CLIN: HCPCS | Performed by: SURGERY

## 2023-06-23 PROCEDURE — 99204 OFFICE O/P NEW MOD 45 MIN: CPT | Performed by: SURGERY

## 2023-06-23 PROCEDURE — G8420 CALC BMI NORM PARAMETERS: HCPCS | Performed by: SURGERY

## 2023-06-23 PROCEDURE — 1036F TOBACCO NON-USER: CPT | Performed by: SURGERY

## 2023-06-23 PROCEDURE — 1123F ACP DISCUSS/DSCN MKR DOCD: CPT | Performed by: SURGERY

## 2023-06-23 RX ORDER — QUETIAPINE FUMARATE 50 MG/1
TABLET, FILM COATED ORAL
COMMUNITY
Start: 2023-06-16

## 2023-06-23 RX ORDER — METOPROLOL SUCCINATE 25 MG/1
25 TABLET, EXTENDED RELEASE ORAL DAILY
COMMUNITY
Start: 2023-06-16

## 2023-06-23 RX ORDER — MIRTAZAPINE 15 MG/1
TABLET, FILM COATED ORAL
COMMUNITY
Start: 2023-06-06

## 2023-06-23 ASSESSMENT — PATIENT HEALTH QUESTIONNAIRE - PHQ9: DEPRESSION UNABLE TO ASSESS: FUNCTIONAL CAPACITY MOTIVATION LIMITS ACCURACY

## 2023-06-23 NOTE — PROGRESS NOTES
Identified patient with two patient identifiers (name and ). Reviewed chart in preparation for visit and have obtained necessary documentation. Meek Aleajndre is a 82 y.o. male  Chief Complaint   Patient presents with    New Patient    Abdominal Pain     Eval Gallbladder     /64 (Site: Right Upper Arm, Position: Sitting, Cuff Size: Small Adult)   Pulse 78   Temp 97.8 °F (36.6 °C) (Oral)   Resp 16   Ht 5' 10\" (1.778 m)   Wt 157 lb (71.2 kg)   SpO2 96%   BMI 22.53 kg/m²     1. Have you been to the ER, urgent care clinic since your last visit? Hospitalized since your last visit?no    2. Have you seen or consulted any other health care providers outside of the [address] [place] [address] since your last visit? Include any pap smears or colon screening.  no

## 2023-06-23 NOTE — PROGRESS NOTES
General Surgery Office Consultation / H & P    CC: Abd pain  History of Present Illness:      Brandy Mesa is a 82 y.o. male who presents with abdominal pain. Patient presents today with his daughter. Patient has significant Alzheimer's. Family reports he has had abdominal pain off and on for last few months. Mainly after large greasy meals. He complains of pain in his upper abdomen and right side. He came to the ER and CT scan and ultrasound showed some gallbladder with stones. I was unable to characterize pain including pain scale, radiation, provoking and alleviating factors secondary to patient's minimally interactive state secondary to mental illness. Past Medical History:   Diagnosis Date    Alzheimer disease ([facility] [address]     Atrial fibrillation ([facility] [address]     on Xarelto    Dementia ([facility] [address]      History reviewed. No pertinent surgical history. History reviewed. No pertinent family history. Social History     Socioeconomic History    Marital status:      Spouse name: None    Number of children: None    Years of education: None    Highest education level: None   Tobacco Use    Smoking status: Never    Smokeless tobacco: Never   Substance and Sexual Activity    Alcohol use: Not Currently    Drug use: Never     Social Determinants of Health     Financial Resource Strain: Unknown    Difficulty of Paying Living Expenses: Patient refused   Food Insecurity: Unknown    Worried About Running Out of Food in the Last Year: Patient refused    Ran Out of Food in the Last Year: Patient refused   Physical Activity: Insufficiently Active    Days of Exercise per Week: 2 days    Minutes of Exercise per Session: 10 min   Intimate Partner Violence: Not At Risk    Fear of Current or Ex-Partner: No    Emotionally Abused: No    Physically Abused: No    Sexually Abused: No      Prior to Admission medications    Medication Sig Start Date End Date Taking?  Authorizing Provider   mirtazapine (REMERON) 15 MG tablet  6/6/23  Yes

## 2023-06-27 ENCOUNTER — TELEPHONE (OUTPATIENT)
Age: 83
End: 2023-06-27

## 2023-07-14 ENCOUNTER — TELEPHONE (OUTPATIENT)
Dept: PRIMARY CARE CLINIC | Facility: CLINIC | Age: 83
End: 2023-07-14

## 2023-07-14 ENCOUNTER — TELEPHONE (OUTPATIENT)
Age: 83
End: 2023-07-14

## 2023-07-14 NOTE — TELEPHONE ENCOUNTER
Please call, Vanessa Hart, on [address] who has questions regarding medication prior to surgery - she would ramona to confirm with nurse.

## 2023-07-14 NOTE — TELEPHONE ENCOUNTER
I called her and gave her the number for preadmission testing. I did remind her it is on the letter he received.

## 2023-07-16 DIAGNOSIS — K59.09 OTHER CONSTIPATION: Primary | ICD-10-CM

## 2023-07-16 RX ORDER — POLYETHYLENE GLYCOL 3350 17 G/17G
17 POWDER, FOR SOLUTION ORAL DAILY
Qty: 1530 G | Refills: 2 | Status: SHIPPED | OUTPATIENT
Start: 2023-07-16 | End: 2024-04-11

## 2023-07-21 ENCOUNTER — TELEPHONE (OUTPATIENT)
Age: 83
End: 2023-07-21

## 2023-07-21 RX ORDER — IBUPROFEN 800 MG/1
800 TABLET ORAL EVERY 6 HOURS PRN
COMMUNITY

## 2023-07-21 RX ORDER — FUROSEMIDE 40 MG/1
40 TABLET ORAL 2 TIMES DAILY PRN
COMMUNITY

## 2023-07-21 RX ORDER — DESONIDE 0.5 MG/G
CREAM TOPICAL DAILY PRN
COMMUNITY

## 2023-07-21 RX ORDER — QUETIAPINE FUMARATE 50 MG/1
25 TABLET, FILM COATED ORAL 2 TIMES DAILY PRN
Status: ON HOLD | COMMUNITY
End: 2023-07-26

## 2023-07-21 RX ORDER — QUETIAPINE FUMARATE 25 MG/1
12.5 TABLET, FILM COATED ORAL 2 TIMES DAILY
COMMUNITY

## 2023-07-21 RX ORDER — BETA-CAROTENE 7500 MCG
1 CAPSULE ORAL DAILY
COMMUNITY

## 2023-07-21 RX ORDER — ACETAMINOPHEN 325 MG/1
650 TABLET ORAL EVERY 6 HOURS PRN
COMMUNITY

## 2023-07-21 NOTE — TELEPHONE ENCOUNTER
I spoke with the doctor. Patient is to stop Xarelto two days prior to surgery. A letter was created, requesting that the patient stop the Xarelto two days prior to surgery. I spoke with the daughter. She gave me the fax number [phone] for [facility] and [facility]. Letter was faxed and a confirmation was received.

## 2023-07-21 NOTE — TELEPHONE ENCOUNTER
Patients daughter, Kendy Bey, called stating that she would like to speak with a nurse to get something in writing sent over to the facility that her father is staying in stating that he needs to stop blood thinner medications two days prior to his scheduled surgery.

## 2023-07-21 NOTE — PERIOP NOTE
PAT PHONE INTERVIEW COMPLETED WITH PT'S LEGAL GUARDIAN, Ada Barnett. POA/LEGAL GUARDIAN PAPERWORK IN Epic. SHE STATES DR Lew Jean STATED TO HOLD ELIQUIS FOR 2 DAYS PRIOR TO SURGERY BUT NEEDS TO SEND IN WRITING TO [facility]. INSTRUCTED HER TO CALL DR FLORES'S OFFICE BACK AGAIN TODAY. CALLED [facility] ([facility]) TO REQUEST MEDICAL HISTORY, MED LIST, AND ADVANCED DIRECTIVE/DNR. THEY WILL FAX. RECEIVED AND PLACED ON CHART, MEDICATIONS AND MEDICAL HISTORY UPDATED. CALLED DR JANY RUSSO'S OFFICE TO REQUEST CARDIAC NOTES AND EKG/RECENT TESTING. PROMPTED TO LEAVE .  LEFT WITH CALLBACK NUMBER AND FAX NUMBER. RECEIVED AND PLACED ON CHART. CARDIAC CLEARANCE NOTE FAXED TO DR FLORES'S OFFICE WITH CONFIRMATION RECEIVED. PT INSTRUCTIONS EMAILED TO PT'S LEGAL GUARDIAN, LANEY BERTRAND PER HER REQUEST. INSTRUCTIONS FAXED TO [facility] WITH CONFIRMATION RECEIVED.

## 2023-07-21 NOTE — PERIOP NOTE
[address] INSTRUCTIONS    Surgery Date:   7/26/23    Your surgeon's office or [facility] staff will call you between 4 PM- 8 PM the day before surgery with your arrival time. If your surgery is on a Monday, you will receive a call the preceding Friday. Please report to [facility] Patient Access/Admitting on the 1st floor. Bring your insurance card, photo identification, and any copayment ( if applicable). If you are going home the same day of your surgery, you must have a responsible adult to drive you home. You need to have a responsible adult to stay with you the first 24 hours after surgery and you should not drive a car for 24 hours following your surgery. Do NOT eat any solid foods after midnight the night before surgery including candy, mint or gum. You may drink clear liquids from midnight until 1 hour prior to your arrival. You may drink up to 12 ounces at one time every 4 hours. Please note special instructions, if applicable, below for medications. Do NOT drink alcohol or smoke 24 hours before surgery. STOP smoking for 14 days prior as it helps with breathing and healing after surgery. If you are being admitted to the hospital, please leave personal belongings/luggage in your car until you have an assigned hospital room number. Please wear comfortable clothes. Wear your glasses instead of contacts. We ask that all money, jewelry and valuables be left at home. Wear no make up, particularly mascara, the day of surgery. All body piercings, rings, and jewelry need to be removed and left at home. Please remove any nail polish or artifical nails from your fingernails. Please wear your hair loose or down. Please no pony-tails, buns, or any metal hair accessories. If you shower the morning of surgery, please do not apply any lotions or powders afterwards. You may wear deodorant, unless having breast surgery. Do not shave any body area within 24 hours of your surgery.   Please

## 2023-07-26 ENCOUNTER — ANESTHESIA EVENT (OUTPATIENT)
Facility: HOSPITAL | Age: 83
End: 2023-07-26
Payer: MEDICARE

## 2023-07-26 ENCOUNTER — APPOINTMENT (OUTPATIENT)
Facility: HOSPITAL | Age: 83
End: 2023-07-26
Attending: SURGERY
Payer: MEDICARE

## 2023-07-26 ENCOUNTER — ANESTHESIA (OUTPATIENT)
Facility: HOSPITAL | Age: 83
End: 2023-07-26
Payer: MEDICARE

## 2023-07-26 ENCOUNTER — HOSPITAL ENCOUNTER (OUTPATIENT)
Facility: HOSPITAL | Age: 83
Setting detail: OUTPATIENT SURGERY
Discharge: NURSING, REHAB OR LONG-TERM CARE | End: 2023-07-26
Attending: SURGERY | Admitting: SURGERY
Payer: MEDICARE

## 2023-07-26 ENCOUNTER — TELEPHONE (OUTPATIENT)
Dept: PRIMARY CARE CLINIC | Facility: CLINIC | Age: 83
End: 2023-07-26

## 2023-07-26 VITALS
TEMPERATURE: 97 F | DIASTOLIC BLOOD PRESSURE: 78 MMHG | HEART RATE: 75 BPM | OXYGEN SATURATION: 95 % | WEIGHT: 154 LBS | RESPIRATION RATE: 13 BRPM | SYSTOLIC BLOOD PRESSURE: 116 MMHG | BODY MASS INDEX: 21.56 KG/M2 | HEIGHT: 71 IN

## 2023-07-26 DIAGNOSIS — Z90.49 S/P CHOLECYSTECTOMY: Primary | ICD-10-CM

## 2023-07-26 DIAGNOSIS — F03.911 DEMENTIA WITH AGITATION, UNSPECIFIED DEMENTIA SEVERITY, UNSPECIFIED DEMENTIA TYPE (HCC): ICD-10-CM

## 2023-07-26 DIAGNOSIS — K80.20 CALCULUS OF GALLBLADDER WITHOUT CHOLECYSTITIS WITHOUT OBSTRUCTION: Primary | ICD-10-CM

## 2023-07-26 DIAGNOSIS — E55.9 VITAMIN D DEFICIENCY, UNSPECIFIED: ICD-10-CM

## 2023-07-26 PROCEDURE — 6360000002 HC RX W HCPCS: Performed by: ANESTHESIOLOGY

## 2023-07-26 PROCEDURE — 2709999900 HC NON-CHARGEABLE SUPPLY: Performed by: SURGERY

## 2023-07-26 PROCEDURE — 2500000003 HC RX 250 WO HCPCS: Performed by: ANESTHESIOLOGY

## 2023-07-26 PROCEDURE — 2500000003 HC RX 250 WO HCPCS: Performed by: NURSE ANESTHETIST, CERTIFIED REGISTERED

## 2023-07-26 PROCEDURE — 6360000002 HC RX W HCPCS: Performed by: SURGERY

## 2023-07-26 PROCEDURE — 88304 TISSUE EXAM BY PATHOLOGIST: CPT

## 2023-07-26 PROCEDURE — 2500000003 HC RX 250 WO HCPCS: Performed by: SURGERY

## 2023-07-26 PROCEDURE — 3600000004 HC SURGERY LEVEL 4 BASE: Performed by: SURGERY

## 2023-07-26 PROCEDURE — 7100000001 HC PACU RECOVERY - ADDTL 15 MIN: Performed by: SURGERY

## 2023-07-26 PROCEDURE — 3700000000 HC ANESTHESIA ATTENDED CARE: Performed by: SURGERY

## 2023-07-26 PROCEDURE — 3600000014 HC SURGERY LEVEL 4 ADDTL 15MIN: Performed by: SURGERY

## 2023-07-26 PROCEDURE — 2720000010 HC SURG SUPPLY STERILE: Performed by: SURGERY

## 2023-07-26 PROCEDURE — 2580000003 HC RX 258: Performed by: NURSE ANESTHETIST, CERTIFIED REGISTERED

## 2023-07-26 PROCEDURE — 6360000002 HC RX W HCPCS: Performed by: NURSE ANESTHETIST, CERTIFIED REGISTERED

## 2023-07-26 PROCEDURE — 3700000001 HC ADD 15 MINUTES (ANESTHESIA): Performed by: SURGERY

## 2023-07-26 PROCEDURE — 2580000003 HC RX 258: Performed by: SURGERY

## 2023-07-26 PROCEDURE — C9399 UNCLASSIFIED DRUGS OR BIOLOG: HCPCS | Performed by: NURSE ANESTHETIST, CERTIFIED REGISTERED

## 2023-07-26 PROCEDURE — 47562 LAPAROSCOPIC CHOLECYSTECTOMY: CPT | Performed by: SURGERY

## 2023-07-26 PROCEDURE — 2580000003 HC RX 258: Performed by: ANESTHESIOLOGY

## 2023-07-26 PROCEDURE — 7100000000 HC PACU RECOVERY - FIRST 15 MIN: Performed by: SURGERY

## 2023-07-26 RX ORDER — ESMOLOL HYDROCHLORIDE 10 MG/ML
INJECTION INTRAVENOUS PRN
Status: DISCONTINUED | OUTPATIENT
Start: 2023-07-26 | End: 2023-07-26 | Stop reason: SDUPTHER

## 2023-07-26 RX ORDER — SUCCINYLCHOLINE/SOD CL,ISO/PF 200MG/10ML
SYRINGE (ML) INTRAVENOUS PRN
Status: DISCONTINUED | OUTPATIENT
Start: 2023-07-26 | End: 2023-07-26 | Stop reason: SDUPTHER

## 2023-07-26 RX ORDER — MIDAZOLAM HYDROCHLORIDE 2 MG/2ML
2 INJECTION, SOLUTION INTRAMUSCULAR; INTRAVENOUS
Status: DISCONTINUED | OUTPATIENT
Start: 2023-07-26 | End: 2023-07-26 | Stop reason: HOSPADM

## 2023-07-26 RX ORDER — PROCHLORPERAZINE EDISYLATE 5 MG/ML
5 INJECTION INTRAMUSCULAR; INTRAVENOUS
Status: DISCONTINUED | OUTPATIENT
Start: 2023-07-26 | End: 2023-07-26 | Stop reason: HOSPADM

## 2023-07-26 RX ORDER — BUPIVACAINE HYDROCHLORIDE AND EPINEPHRINE 5; 5 MG/ML; UG/ML
INJECTION, SOLUTION EPIDURAL; INTRACAUDAL; PERINEURAL PRN
Status: DISCONTINUED | OUTPATIENT
Start: 2023-07-26 | End: 2023-07-26 | Stop reason: HOSPADM

## 2023-07-26 RX ORDER — ONDANSETRON 2 MG/ML
4 INJECTION INTRAMUSCULAR; INTRAVENOUS
Status: DISCONTINUED | OUTPATIENT
Start: 2023-07-26 | End: 2023-07-26 | Stop reason: HOSPADM

## 2023-07-26 RX ORDER — LORAZEPAM 2 MG/ML
0.5 INJECTION INTRAMUSCULAR
Status: DISCONTINUED | OUTPATIENT
Start: 2023-07-26 | End: 2023-07-26 | Stop reason: HOSPADM

## 2023-07-26 RX ORDER — LIDOCAINE HYDROCHLORIDE 10 MG/ML
1 INJECTION, SOLUTION EPIDURAL; INFILTRATION; INTRACAUDAL; PERINEURAL
Status: COMPLETED | OUTPATIENT
Start: 2023-07-26 | End: 2023-07-26

## 2023-07-26 RX ORDER — SODIUM CHLORIDE 0.9 % (FLUSH) 0.9 %
5-40 SYRINGE (ML) INJECTION PRN
Status: DISCONTINUED | OUTPATIENT
Start: 2023-07-26 | End: 2023-07-26 | Stop reason: HOSPADM

## 2023-07-26 RX ORDER — SODIUM CHLORIDE 9 MG/ML
INJECTION, SOLUTION INTRAVENOUS PRN
Status: DISCONTINUED | OUTPATIENT
Start: 2023-07-26 | End: 2023-07-26 | Stop reason: HOSPADM

## 2023-07-26 RX ORDER — SODIUM CHLORIDE 0.9 % (FLUSH) 0.9 %
5-40 SYRINGE (ML) INJECTION EVERY 12 HOURS SCHEDULED
Status: DISCONTINUED | OUTPATIENT
Start: 2023-07-26 | End: 2023-07-26 | Stop reason: HOSPADM

## 2023-07-26 RX ORDER — INDOCYANINE GREEN AND WATER 25 MG
2.5 KIT INJECTION ONCE
Status: COMPLETED | OUTPATIENT
Start: 2023-07-26 | End: 2023-07-26

## 2023-07-26 RX ORDER — LIDOCAINE HYDROCHLORIDE 20 MG/ML
INJECTION, SOLUTION EPIDURAL; INFILTRATION; INTRACAUDAL; PERINEURAL PRN
Status: DISCONTINUED | OUTPATIENT
Start: 2023-07-26 | End: 2023-07-26 | Stop reason: SDUPTHER

## 2023-07-26 RX ORDER — OXYCODONE HYDROCHLORIDE AND ACETAMINOPHEN 5; 325 MG/1; MG/1
1 TABLET ORAL EVERY 6 HOURS PRN
Qty: 18 TABLET | Refills: 0 | Status: SHIPPED | OUTPATIENT
Start: 2023-07-26 | End: 2023-07-31

## 2023-07-26 RX ORDER — IPRATROPIUM BROMIDE AND ALBUTEROL SULFATE 2.5; .5 MG/3ML; MG/3ML
1 SOLUTION RESPIRATORY (INHALATION)
Status: DISCONTINUED | OUTPATIENT
Start: 2023-07-26 | End: 2023-07-26 | Stop reason: HOSPADM

## 2023-07-26 RX ORDER — SODIUM CHLORIDE, SODIUM LACTATE, POTASSIUM CHLORIDE, CALCIUM CHLORIDE 600; 310; 30; 20 MG/100ML; MG/100ML; MG/100ML; MG/100ML
INJECTION, SOLUTION INTRAVENOUS CONTINUOUS
Status: DISCONTINUED | OUTPATIENT
Start: 2023-07-26 | End: 2023-07-26 | Stop reason: HOSPADM

## 2023-07-26 RX ORDER — FENTANYL CITRATE 50 UG/ML
100 INJECTION, SOLUTION INTRAMUSCULAR; INTRAVENOUS
Status: DISCONTINUED | OUTPATIENT
Start: 2023-07-26 | End: 2023-07-26 | Stop reason: HOSPADM

## 2023-07-26 RX ORDER — DEXMEDETOMIDINE HYDROCHLORIDE 100 UG/ML
INJECTION, SOLUTION INTRAVENOUS PRN
Status: DISCONTINUED | OUTPATIENT
Start: 2023-07-26 | End: 2023-07-26 | Stop reason: SDUPTHER

## 2023-07-26 RX ORDER — DIPHENHYDRAMINE HYDROCHLORIDE 50 MG/ML
12.5 INJECTION INTRAMUSCULAR; INTRAVENOUS
Status: DISCONTINUED | OUTPATIENT
Start: 2023-07-26 | End: 2023-07-26 | Stop reason: HOSPADM

## 2023-07-26 RX ORDER — PROPOFOL 10 MG/ML
INJECTION, EMULSION INTRAVENOUS PRN
Status: DISCONTINUED | OUTPATIENT
Start: 2023-07-26 | End: 2023-07-26 | Stop reason: SDUPTHER

## 2023-07-26 RX ORDER — EPHEDRINE SULFATE 50 MG/ML
INJECTION INTRAVENOUS PRN
Status: DISCONTINUED | OUTPATIENT
Start: 2023-07-26 | End: 2023-07-26 | Stop reason: SDUPTHER

## 2023-07-26 RX ORDER — ACETAMINOPHEN 500 MG
1000 TABLET ORAL ONCE
Status: DISCONTINUED | OUTPATIENT
Start: 2023-07-26 | End: 2023-07-26 | Stop reason: HOSPADM

## 2023-07-26 RX ORDER — FENTANYL CITRATE 50 UG/ML
INJECTION, SOLUTION INTRAMUSCULAR; INTRAVENOUS PRN
Status: DISCONTINUED | OUTPATIENT
Start: 2023-07-26 | End: 2023-07-26 | Stop reason: SDUPTHER

## 2023-07-26 RX ORDER — HYDRALAZINE HYDROCHLORIDE 20 MG/ML
10 INJECTION INTRAMUSCULAR; INTRAVENOUS
Status: DISCONTINUED | OUTPATIENT
Start: 2023-07-26 | End: 2023-07-26 | Stop reason: HOSPADM

## 2023-07-26 RX ORDER — POLYETHYLENE GLYCOL 3350 17 G/17G
17 POWDER, FOR SOLUTION ORAL DAILY PRN
Qty: 14 EACH | Refills: 0 | Status: SHIPPED | OUTPATIENT
Start: 2023-07-26

## 2023-07-26 RX ORDER — HYDROMORPHONE HYDROCHLORIDE 1 MG/ML
0.5 INJECTION, SOLUTION INTRAMUSCULAR; INTRAVENOUS; SUBCUTANEOUS EVERY 5 MIN PRN
Status: DISCONTINUED | OUTPATIENT
Start: 2023-07-26 | End: 2023-07-26 | Stop reason: HOSPADM

## 2023-07-26 RX ORDER — OXYCODONE HYDROCHLORIDE 5 MG/1
5 TABLET ORAL
Status: DISCONTINUED | OUTPATIENT
Start: 2023-07-26 | End: 2023-07-26 | Stop reason: HOSPADM

## 2023-07-26 RX ORDER — LABETALOL HYDROCHLORIDE 5 MG/ML
INJECTION, SOLUTION INTRAVENOUS PRN
Status: DISCONTINUED | OUTPATIENT
Start: 2023-07-26 | End: 2023-07-26 | Stop reason: SDUPTHER

## 2023-07-26 RX ORDER — ONDANSETRON 2 MG/ML
INJECTION INTRAMUSCULAR; INTRAVENOUS PRN
Status: DISCONTINUED | OUTPATIENT
Start: 2023-07-26 | End: 2023-07-26 | Stop reason: SDUPTHER

## 2023-07-26 RX ORDER — DEXAMETHASONE SODIUM PHOSPHATE 4 MG/ML
INJECTION, SOLUTION INTRA-ARTICULAR; INTRALESIONAL; INTRAMUSCULAR; INTRAVENOUS; SOFT TISSUE PRN
Status: DISCONTINUED | OUTPATIENT
Start: 2023-07-26 | End: 2023-07-26 | Stop reason: SDUPTHER

## 2023-07-26 RX ORDER — ERGOCALCIFEROL 1.25 MG/1
CAPSULE ORAL
Qty: 12 CAPSULE | Refills: 0 | Status: SHIPPED | OUTPATIENT
Start: 2023-07-26

## 2023-07-26 RX ORDER — PHENYLEPHRINE HYDROCHLORIDE 10 MG/ML
INJECTION INTRAVENOUS PRN
Status: DISCONTINUED | OUTPATIENT
Start: 2023-07-26 | End: 2023-07-26 | Stop reason: SDUPTHER

## 2023-07-26 RX ORDER — ROCURONIUM BROMIDE 10 MG/ML
INJECTION, SOLUTION INTRAVENOUS PRN
Status: DISCONTINUED | OUTPATIENT
Start: 2023-07-26 | End: 2023-07-26 | Stop reason: SDUPTHER

## 2023-07-26 RX ORDER — KETOROLAC TROMETHAMINE 30 MG/ML
15 INJECTION, SOLUTION INTRAMUSCULAR; INTRAVENOUS ONCE
Status: COMPLETED | OUTPATIENT
Start: 2023-07-26 | End: 2023-07-26

## 2023-07-26 RX ORDER — FENTANYL CITRATE 50 UG/ML
25 INJECTION, SOLUTION INTRAMUSCULAR; INTRAVENOUS EVERY 5 MIN PRN
Status: DISCONTINUED | OUTPATIENT
Start: 2023-07-26 | End: 2023-07-26 | Stop reason: HOSPADM

## 2023-07-26 RX ADMIN — ROCURONIUM BROMIDE 5 MG: 10 SOLUTION INTRAVENOUS at 07:29

## 2023-07-26 RX ADMIN — ROCURONIUM BROMIDE 25 MG: 10 SOLUTION INTRAVENOUS at 07:41

## 2023-07-26 RX ADMIN — INDOCYANINE GREEN AND WATER 2.5 MG: KIT at 06:57

## 2023-07-26 RX ADMIN — LIDOCAINE HYDROCHLORIDE 40 MG: 20 INJECTION, SOLUTION EPIDURAL; INFILTRATION; INTRACAUDAL; PERINEURAL at 07:29

## 2023-07-26 RX ADMIN — ESMOLOL HYDROCHLORIDE 30 MG: 10 INJECTION, SOLUTION INTRAVENOUS at 07:52

## 2023-07-26 RX ADMIN — SODIUM CHLORIDE, POTASSIUM CHLORIDE, SODIUM LACTATE AND CALCIUM CHLORIDE: 600; 310; 30; 20 INJECTION, SOLUTION INTRAVENOUS at 06:52

## 2023-07-26 RX ADMIN — KETOROLAC TROMETHAMINE 15 MG: 30 INJECTION, SOLUTION INTRAMUSCULAR; INTRAVENOUS at 09:50

## 2023-07-26 RX ADMIN — PHENYLEPHRINE HYDROCHLORIDE 160 MCG: 10 INJECTION INTRAVENOUS at 07:33

## 2023-07-26 RX ADMIN — PROPOFOL 40 MG: 10 INJECTION, EMULSION INTRAVENOUS at 07:57

## 2023-07-26 RX ADMIN — PROPOFOL 150 MCG/KG/MIN: 10 INJECTION, EMULSION INTRAVENOUS at 07:34

## 2023-07-26 RX ADMIN — Medication 120 MG: at 07:29

## 2023-07-26 RX ADMIN — LIDOCAINE HYDROCHLORIDE 1 ML: 10 INJECTION, SOLUTION EPIDURAL; INFILTRATION; INTRACAUDAL; PERINEURAL at 06:53

## 2023-07-26 RX ADMIN — FENTANYL CITRATE 25 MCG: 50 INJECTION, SOLUTION INTRAMUSCULAR; INTRAVENOUS at 09:47

## 2023-07-26 RX ADMIN — ESMOLOL HYDROCHLORIDE 20 MG: 10 INJECTION, SOLUTION INTRAVENOUS at 08:05

## 2023-07-26 RX ADMIN — PROPOFOL 40 MG: 10 INJECTION, EMULSION INTRAVENOUS at 07:45

## 2023-07-26 RX ADMIN — FENTANYL CITRATE 50 MCG: 50 INJECTION, SOLUTION INTRAMUSCULAR; INTRAVENOUS at 08:13

## 2023-07-26 RX ADMIN — PHENYLEPHRINE HYDROCHLORIDE 40 MCG/MIN: 10 INJECTION INTRAVENOUS at 07:34

## 2023-07-26 RX ADMIN — FENTANYL CITRATE 50 MCG: 50 INJECTION, SOLUTION INTRAMUSCULAR; INTRAVENOUS at 07:29

## 2023-07-26 RX ADMIN — ONDANSETRON HYDROCHLORIDE 4 MG: 2 INJECTION, SOLUTION INTRAMUSCULAR; INTRAVENOUS at 08:31

## 2023-07-26 RX ADMIN — EPHEDRINE SULFATE 20 MG: 50 INJECTION INTRAVENOUS at 07:32

## 2023-07-26 RX ADMIN — DEXAMETHASONE SODIUM PHOSPHATE 4 MG: 4 INJECTION, SOLUTION INTRAMUSCULAR; INTRAVENOUS at 07:45

## 2023-07-26 RX ADMIN — LABETALOL HYDROCHLORIDE 5 MG: 5 INJECTION INTRAVENOUS at 08:05

## 2023-07-26 RX ADMIN — WATER 2000 MG: 1 INJECTION INTRAMUSCULAR; INTRAVENOUS; SUBCUTANEOUS at 07:41

## 2023-07-26 RX ADMIN — DEXMEDETOMIDINE HYDROCHLORIDE 8 MCG: 100 INJECTION, SOLUTION, CONCENTRATE INTRAVENOUS at 07:40

## 2023-07-26 RX ADMIN — SUGAMMADEX 200 MG: 100 INJECTION, SOLUTION INTRAVENOUS at 08:31

## 2023-07-26 RX ADMIN — PROPOFOL 120 MG: 10 INJECTION, EMULSION INTRAVENOUS at 07:29

## 2023-07-26 ASSESSMENT — PAIN - FUNCTIONAL ASSESSMENT: PAIN_FUNCTIONAL_ASSESSMENT: NONE - DENIES PAIN

## 2023-07-26 ASSESSMENT — PAIN SCALES - GENERAL: PAINLEVEL_OUTOF10: 0

## 2023-07-26 NOTE — H&P
General Surgery History and Physical    CC: abd pain    History of Present Illness:      Nohemy Khanna is a 82 y.o. male who presents with symp cholelithiasis. Past Medical History:   Diagnosis Date    Alzheimer disease ([address])     Atrial fibrillation ([address])     on Xarelto    Dementia ([address])     DAUGHTER STATES VASCULAR DEMENTIA, POSSIBLE MINI STROKE, BUT NEVER GOT CONFIRMATION OF THAT, LEWY BODY DEMENTIA WITH BEHAVIOR DISORDER    Hyperlipidemia     Hypertension     Insomnia     DAR (obstructive sleep apnea)     NOT CURRENTLY ABLE TO BE ON CPAP    Restless leg syndrome      History reviewed. No pertinent surgical history. Family History   Problem Relation Age of Onset    Anesth Problems Neg Hx      Social History     Socioeconomic History    Marital status:      Spouse name: None    Number of children: None    Years of education: None    Highest education level: None   Tobacco Use    Smoking status: Never    Smokeless tobacco: Never   Vaping Use    Vaping Use: Never used   Substance and Sexual Activity    Alcohol use: Not Currently    Drug use: Never     Social Determinants of Health     Financial Resource Strain: Unknown    Difficulty of Paying Living Expenses: Patient refused   Food Insecurity: Unknown    Worried About Running Out of Food in the Last Year: Patient refused    Ran Out of Food in the Last Year: Patient refused   Physical Activity: Insufficiently Active    Days of Exercise per Week: 2 days    Minutes of Exercise per Session: 10 min   Intimate Partner Violence: Not At Risk    Fear of Current or Ex-Partner: No    Emotionally Abused: No    Physically Abused: No    Sexually Abused: No      Prior to Admission medications    Medication Sig Start Date End Date Taking?  Authorizing Provider   QUEtiapine (SEROQUEL) 25 MG tablet Take 0.5 tablets by mouth 2 times daily   Yes Historical Provider, MD   Probiotic Product (PROBIOTIC-10) CHEW Take 2 capsules by mouth daily   Yes Historical Provider, MD

## 2023-07-26 NOTE — TELEPHONE ENCOUNTER
Patient daughter Tom Horn calling to see if Dr. Swathi Lynn can put in another order for physical therapy with [address]. Tom Horn stated that her father just have surgery to remove his gallbladder. Wants to get the PT order fax to [phone]. Please call patient daughter for any questions.

## 2023-07-26 NOTE — OP NOTE
OPERATIVE NOTE    Date of Procedure: 7/26/2023     Preoperative Diagnosis: Biliary calculus of other site without obstruction [K80.80]  Postoperative Diagnosis: Post-Op Diagnosis Codes:     * Biliary calculus of other site without obstruction [K80.80]      Procedure: Procedure(s):  LAPAROSCOPIC CHOLECYSTECTOMY    Surgeon: Luis Johns MD    Surgical Staff: Circulator: Kobi Escobar RN  Surgical Assistant: Silke Gasca  Scrub Person First: Lisa Segovia SA Orientation: Natalie Diaz    Anesthesia: General   Indications: 83 y/o M with symptomatic cholelithiasis here for lap shefali  Findings: Short cystic duct, unable to shoot IOC. Description of Operation: Diogo Foster was identified in the pre-operative holding area. Informed consent was obtained after a complete discussion of risks, benefits and alternatives to surgery were had with the patient. The patient was brought back to the operating room and placed under general endotracheal anesthesia in the supine position on the operating room table with a foot board. The patient was then prepped and draped in the usual sterile fashion. A timeout was performed. We then injected local anesthetic into the negar-umbilical skin and subcutaneous tissue. A 12 mm incision was then made using an 11 blade. We dissected down to the anterior rectus sheath. This was grasped with a Kocher clamp on each side of midline. A small incision was made in the linea alba and a hemostat was used to bluntly enter the abdomen. A 12 mm trocar was safely placed after we confirmed proper location and the abdomen was insufflated to 15 mm Hg. The patient was placed in steep reverse Trendelenburg with the table tilted to the left. We then placed three additional 5 mm trocars using direct visualization under the right costal margin after the injection of local anesthetic. The dome of the gallbladder was grasped and retracted anteriorly and laterally over the liver edge.  The gallbladder

## 2023-07-27 NOTE — TELEPHONE ENCOUNTER
Paperwork - demos, referral to PT, and medical office notes attached with fax cover sheet to [facility].  Will scan in once confirmation has been recieved

## 2023-08-07 ENCOUNTER — OFFICE VISIT (OUTPATIENT)
Age: 83
End: 2023-08-07

## 2023-08-07 VITALS
OXYGEN SATURATION: 94 % | BODY MASS INDEX: 21.84 KG/M2 | DIASTOLIC BLOOD PRESSURE: 62 MMHG | WEIGHT: 156 LBS | SYSTOLIC BLOOD PRESSURE: 115 MMHG | TEMPERATURE: 97.9 F | RESPIRATION RATE: 20 BRPM | HEIGHT: 71 IN | HEART RATE: 67 BPM

## 2023-08-07 DIAGNOSIS — Z90.49 S/P LAPAROSCOPIC CHOLECYSTECTOMY: ICD-10-CM

## 2023-08-07 DIAGNOSIS — Z09 POSTOPERATIVE EXAMINATION: Primary | ICD-10-CM

## 2023-08-07 PROCEDURE — 99024 POSTOP FOLLOW-UP VISIT: CPT

## 2023-08-07 ASSESSMENT — PATIENT HEALTH QUESTIONNAIRE - PHQ9: DEPRESSION UNABLE TO ASSESS: FUNCTIONAL CAPACITY MOTIVATION LIMITS ACCURACY

## 2023-08-28 ENCOUNTER — APPOINTMENT (OUTPATIENT)
Facility: HOSPITAL | Age: 83
End: 2023-08-28
Payer: MEDICARE

## 2023-08-28 ENCOUNTER — HOSPITAL ENCOUNTER (EMERGENCY)
Facility: HOSPITAL | Age: 83
Discharge: HOME | End: 2023-08-28
Attending: EMERGENCY MEDICINE
Payer: MEDICARE

## 2023-08-28 VITALS
DIASTOLIC BLOOD PRESSURE: 58 MMHG | TEMPERATURE: 97 F | RESPIRATION RATE: 14 BRPM | OXYGEN SATURATION: 98 % | BODY MASS INDEX: 22.32 KG/M2 | HEART RATE: 64 BPM | SYSTOLIC BLOOD PRESSURE: 128 MMHG | WEIGHT: 160 LBS
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
## 2023-08-28 LAB
ALBUMIN SERPL-MCNC: 3.6 G/DL (ref 3.5–5)
ALBUMIN/GLOB SERPL: 1.1 (ref 1.1–2.2)
ALP SERPL-CCNC: 116 U/L (ref 45–117)
ALT SERPL-CCNC: 25 U/L (ref 12–78)
ANION GAP SERPL CALC-SCNC: 5 MMOL/L (ref 5–15)
AST SERPL-CCNC: 18 U/L (ref 15–37)
BASOPHILS # BLD: 0 K/UL (ref 0–0.1)
BASOPHILS NFR BLD: 1 % (ref 0–1)
BILIRUB SERPL-MCNC: 0.6 MG/DL (ref 0.2–1)
BUN SERPL-MCNC: 14 MG/DL (ref 6–20)
BUN/CREAT SERPL: 19 (ref 12–20)
CALCIUM SERPL-MCNC: 9.2 MG/DL (ref 8.5–10.1)
CHLORIDE SERPL-SCNC: 105 MMOL/L (ref 97–108)
CO2 SERPL-SCNC: 32 MMOL/L (ref 21–32)
CREAT SERPL-MCNC: 0.72 MG/DL (ref 0.7–1.3)
DIFFERENTIAL METHOD BLD: ABNORMAL
EKG DIAGNOSIS: NORMAL
EKG Q-T INTERVAL: 440 MS
EKG QRS DURATION: 96 MS
EKG QTC CALCULATION (BAZETT): 478 MS
EKG R AXIS: -60 DEGREES
EKG T AXIS: 25 DEGREES
EKG VENTRICULAR RATE: 71 BPM
EOSINOPHIL # BLD: 0.4 K/UL (ref 0–0.4)
EOSINOPHIL NFR BLD: 7 % (ref 0–7)
ERYTHROCYTE [DISTWIDTH] IN BLOOD BY AUTOMATED COUNT: 12.6 % (ref 11.5–14.5)
GLOBULIN SER CALC-MCNC: 3.2 G/DL (ref 2–4)
GLUCOSE SERPL-MCNC: 169 MG/DL (ref 65–100)
HCT VFR BLD AUTO: 42.5 % (ref 36.6–50.3)
HGB BLD-MCNC: 13.9 G/DL (ref 12.1–17)
IMM GRANULOCYTES # BLD AUTO: 0 K/UL (ref 0–0.04)
IMM GRANULOCYTES NFR BLD AUTO: 0 % (ref 0–0.5)
LYMPHOCYTES # BLD: 1.5 K/UL (ref 0.8–3.5)
LYMPHOCYTES NFR BLD: 23 % (ref 12–49)
MCH RBC QN AUTO: 30.2 PG (ref 26–34)
MCHC RBC AUTO-ENTMCNC: 32.7 G/DL (ref 30–36.5)
MCV RBC AUTO: 92.2 FL (ref 80–99)
MONOCYTES # BLD: 0.4 K/UL (ref 0–1)
MONOCYTES NFR BLD: 6 % (ref 5–13)
NEUTS SEG # BLD: 4.2 K/UL (ref 1.8–8)
NEUTS SEG NFR BLD: 64 % (ref 32–75)
NRBC # BLD: 0 K/UL (ref 0–0.01)
NRBC BLD-RTO: 0 PER 100 WBC
PLATELET # BLD AUTO: 129 K/UL (ref 150–400)
PMV BLD AUTO: 11.3 FL (ref 8.9–12.9)
POTASSIUM SERPL-SCNC: 3.9 MMOL/L (ref 3.5–5.1)
PROT SERPL-MCNC: 6.8 G/DL (ref 6.4–8.2)
RBC # BLD AUTO: 4.61 M/UL (ref 4.1–5.7)
SODIUM SERPL-SCNC: 142 MMOL/L (ref 136–145)
TROPONIN I SERPL HS-MCNC: 9 NG/L (ref 0–76)
WBC # BLD AUTO: 6.5 K/UL (ref 4.1–11.1)

## 2023-08-28 PROCEDURE — 99285 EMERGENCY DEPT VISIT HI MDM: CPT

## 2023-08-28 PROCEDURE — 70450 CT HEAD/BRAIN W/O DYE: CPT

## 2023-08-28 PROCEDURE — 93005 ELECTROCARDIOGRAM TRACING: CPT | Performed by: NURSE PRACTITIONER

## 2023-08-28 PROCEDURE — 71045 X-RAY EXAM CHEST 1 VIEW: CPT

## 2023-08-28 PROCEDURE — 80053 COMPREHEN METABOLIC PANEL: CPT

## 2023-08-28 PROCEDURE — 36415 COLL VENOUS BLD VENIPUNCTURE: CPT

## 2023-08-28 PROCEDURE — 85025 COMPLETE CBC W/AUTO DIFF WBC: CPT

## 2023-08-28 PROCEDURE — 72125 CT NECK SPINE W/O DYE: CPT

## 2023-08-28 PROCEDURE — 84484 ASSAY OF TROPONIN QUANT: CPT

## 2023-08-28 ASSESSMENT — PAIN - FUNCTIONAL ASSESSMENT: PAIN_FUNCTIONAL_ASSESSMENT: NONE - DENIES PAIN

## 2023-08-28 NOTE — DISCHARGE INSTRUCTIONS
Thank you for allowing us to provide you with medical care today. We realize that you have many choices for your emergency care needs. We thank you for choosing [facility]. Please choose us in the future for any continued health care needs. We hope we addressed all of your medical concerns. We strive to provide excellent quality care in the Emergency Department. Anything less than excellent does not meet our expectations. The exam and treatment you received in the Emergency Department were for an emergent problem and are not intended as complete care. It is important that you follow up with a doctor, nurse practitioner, or [address] [place] [address] assistant for ongoing care. If your symptoms worsen or you do not improve as expected and you are unable to reach your usual health care provider, you should return to the Emergency Department. We are available 24 hours a day. Take this sheet with you when you go to your follow-up visit. If you have any problem arranging the follow-up visit, contact the Emergency Department immediately. Make an appointment your family doctor for follow up of this visit. Return to the ER if you are unable to be seen in a timely manner.

## 2023-08-28 NOTE — ED PROVIDER NOTES
[facility] EMERGENCY CTR  EMERGENCY DEPARTMENT ENCOUNTER      Pt Name: Rosie Morrell  MRN: [id number]  [address] [facility] 1940  Date of evaluation: 8/28/2023  Provider: DANILO Sommer   Patient is a 82 y.o. male with pmhx of of Alzheimer's, dementia, A-fib on Xarelto, hypertension, high cholesterol, DAR, restless leg  who presents today with complaints of possible fall. Patient is brought in by his daughter to the emergency room today. She states that she got a call from the nursing facility which he resides that he was found sleeping on the floor. She states it is unknown whether or not he fell or had a syncopal episode. Patient daughter states that he when he woke up from his nap he was holding onto his head. When she asked him if he fell she states he also said yes, but she states she is unsure to know if this is correct due to his history of Alzheimer/dementia. Endorses that he takes Xarelto so she wanted to get him checked out to make sure he did not have any bleeding into his brain. There are no other complaints, changes or physical findings at this time.       PAST MEDICAL HISTORY     Past Medical History:   Diagnosis Date    Alzheimer disease ([address])     Atrial fibrillation ([address])     on Xarelto    Dementia ([facility])     DAUGHTER STATES VASCULAR DEMENTIA, POSSIBLE MINI STROKE, BUT NEVER GOT CONFIRMATION OF THAT, LEWY BODY DEMENTIA WITH BEHAVIOR DISORDER    Hyperlipidemia     Hypertension     Insomnia     DAR (obstructive sleep apnea)     NOT CURRENTLY ABLE TO BE ON CPAP    Restless leg syndrome          SURGICAL HISTORY       Past Surgical History:   Procedure Laterality Date    CHOLECYSTECTOMY, LAPAROSCOPIC N/A 7/26/2023    LAPAROSCOPIC CHOLECYSTECTOMY performed by Elliott Garcias MD at [address]       Discharge Medication List as of 8/28/2023  6:33 PM        CONTINUE these medications which have NOT CHANGED    Details   polyethylene glycol (MIRALAX) 17 g packet Take 1

## 2023-08-28 NOTE — PROGRESS NOTES
Nurse reviewed discharge instructions with patient's daughter. Patient's daughter verbalized understanding and all questions were answered.

## 2023-08-28 NOTE — ED TRIAGE NOTES
Patient presents to the ED with daughter from [facility] at the [facility]. Patient's daughter states staff at the facility found the patient on the floor around noon when they were getting him for lunch. She states patient has been sleepy since. When patient woke up around 3pm he put his hand to his head. Staff did not witness the patient fall. Unknown if patient hit his head.  Takes Xarelto

## 2023-09-02 ENCOUNTER — HOSPITAL ENCOUNTER (EMERGENCY)
Facility: HOSPITAL | Age: 83
Discharge: HOME | End: 2023-09-02
Attending: EMERGENCY MEDICINE
Payer: MEDICARE

## 2023-09-02 ENCOUNTER — APPOINTMENT (OUTPATIENT)
Facility: HOSPITAL | Age: 83
End: 2023-09-02
Payer: MEDICARE

## 2023-09-02 VITALS
RESPIRATION RATE: 15 BRPM | WEIGHT: 152.56 LBS | BODY MASS INDEX: 21.28 KG/M2 | TEMPERATURE: 97.4 F | SYSTOLIC BLOOD PRESSURE: 126 MMHG | OXYGEN SATURATION: 100 % | HEART RATE: 79 BPM | DIASTOLIC BLOOD PRESSURE: 72 MMHG

## 2023-09-02 DIAGNOSIS — S09.90XA INJURY OF HEAD, INITIAL ENCOUNTER: Primary | ICD-10-CM

## 2023-09-02 DIAGNOSIS — S00.01XA ABRASION OF SCALP, INITIAL ENCOUNTER: ICD-10-CM

## 2023-09-02 PROCEDURE — 70450 CT HEAD/BRAIN W/O DYE: CPT

## 2023-09-02 PROCEDURE — 99284 EMERGENCY DEPT VISIT MOD MDM: CPT

## 2023-09-02 PROCEDURE — 72125 CT NECK SPINE W/O DYE: CPT

## 2023-09-02 PROCEDURE — 6370000000 HC RX 637 (ALT 250 FOR IP): Performed by: EMERGENCY MEDICINE

## 2023-09-02 RX ORDER — ACETAMINOPHEN 500 MG
1000 TABLET ORAL
Status: COMPLETED | OUTPATIENT
Start: 2023-09-02 | End: 2023-09-02

## 2023-09-02 RX ADMIN — ACETAMINOPHEN 1000 MG: 500 TABLET ORAL at 01:51

## 2023-09-02 ASSESSMENT — PAIN - FUNCTIONAL ASSESSMENT: PAIN_FUNCTIONAL_ASSESSMENT: PAIN ASSESSMENT IN ADVANCED DEMENTIA (PAINAD)

## 2023-09-02 ASSESSMENT — PAIN SCALES - PAIN ASSESSMENT IN ADVANCED DEMENTIA (PAINAD)
FACIALEXPRESSION: 0
BREATHING: 0
NEGVOCALIZATION: 0
BODYLANGUAGE: 0
CONSOLABILITY: 0
TOTALSCORE: 0

## 2023-09-02 NOTE — ED NOTES
Pain assessment on discharge was   Condition Stable  Patient discharged to home  Patient education was completed  Education taught to patient POA  Teaching method used was handout and verbal  Understanding of teaching was good  Patient was discharged ambulatory with assistance to wheelchair  Discharged with family  Valuables were given to patient family remained in possession of belongings during stay.       Faizan Shaikh RN  09/02/23 [id number]

## 2023-09-02 NOTE — ED TRIAGE NOTES
Pt brought by daughters (POA) from [address] suspecting a fall. Pt was taken to facility at 4pm and everything was normal. At 23:00 care giver found pt to have multiple abrasions to his head. Facility reports they didn't see anything. Pt on xarelto.

## 2023-09-02 NOTE — ED PROVIDER NOTES
is not in acute distress. Appearance: Normal appearance. He is not ill-appearing, toxic-appearing or diaphoretic. HENT:      Head: Normocephalic. Comments: Superficial non bleeding small scratches to right forehead     Right Ear: External ear normal.      Left Ear: External ear normal.      Nose: Nose normal.      Mouth/Throat:      Mouth: Mucous membranes are moist.   Eyes:      General: No scleral icterus. Cardiovascular:      Rate and Rhythm: Normal rate and regular rhythm. Pulmonary:      Effort: Pulmonary effort is normal. No respiratory distress. Breath sounds: No stridor. Abdominal:      General: There is no distension. Musculoskeletal:         General: No swelling, tenderness or deformity. Cervical back: Neck supple. No rigidity. Skin:     Coloration: Skin is not jaundiced. Neurological:      Mental Status: Mental status is at baseline. Psychiatric:         Behavior: Behavior normal.       DIAGNOSTIC RESULTS       RADIOLOGY:   Non-plain film images such as CT, Ultrasound and MRI are read by the radiologist. Plain radiographic images are visualized and preliminarily interpreted by the emergency physician with the below findings:    See ED course below. Interpretation per the Radiologist below, if available at the time of this note:    CT Head W/O Contrast   Final Result   No acute intracranial process. No significant change from the prior study. CT CSpine W/O Contrast   Final Result   No acute fracture or subluxation. Multilevel degenerative changes. LABS:  Labs Reviewed - No data to display    All other labs were within normal range or not returned as of this dictation. EMERGENCY DEPARTMENT COURSE and DIFFERENTIAL DIAGNOSIS/MDM:   Medical Decision Making  Differential diagnosis includes scalp abrasion, scalp laceration, traumatic hemorrhage, skull fracture.     Patient appeared comfortable during my evaluation, but family reported there are instances where he stiffens showing that he is in pain. After Tylenol was administered per family request, they reported he is back to his baseline. I offered further imaging to rule out other traumatic injuries, but they report that he is doing okay right now. Caregiver is going to be with him all night and will monitor him. They will return if there is any concern for further injuries. Patient appears well, vital signs are stable, will discharge. Amount and/or Complexity of Data Reviewed  Radiology: ordered and independent interpretation performed. Decision-making details documented in ED Course. Risk  OTC drugs. EKG: All EKG's are interpreted by the Emergency Department Physician who either signs or Co-signs this chart in the absence of a cardiologist.    ED Course as of 09/02/23 2327   Sat Sep 02, 2023   0125 I have independently viewed the obtained radiographic images and note CT scans without acute findings. Will await radiology read. [AL]      ED Course User Index  [AL] Anatoliy Covarrubias MD       CRITICAL CARE TIME   Total Cirtical Care time was 0 minutes, excluding separately reportable procedures. There was a high probability of clinically significant/life threatening deterioration in the patient's condition with required my urgent intervention. CONSULTS:  None    PROCEDURES:  Unless otherwise noted below, none     Procedures    FINAL IMPRESSION      1. Injury of head, initial encounter    2.  Abrasion of scalp, initial encounter          DISPOSITION/PLAN   DISPOSITION Decision To Discharge 09/02/2023 02:22:51 AM    PATIENT REFERRED TO:  [facility] EMERGENCY [facility]  [id number] [address]  [phone]    As needed    Brian Estrada, [address] [place] [address]     Schedule an appointment as soon as possible for a visit in 3 days  for symptom recheck      DISCHARGE MEDICATIONS:  Discharge Medication List as of 9/2/2023

## 2023-09-02 NOTE — DISCHARGE INSTRUCTIONS
The CT scans did not show any acute abnormalities. Please keep the lacerations on your scalp clean with soap and water. Thank you.

## 2023-09-07 ENCOUNTER — TELEPHONE (OUTPATIENT)
Dept: PRIMARY CARE CLINIC | Facility: CLINIC | Age: 83
End: 2023-09-07

## 2023-09-07 NOTE — TELEPHONE ENCOUNTER
Yahaira Rivera [facility] called and Patient was a Previous patient and daughter reached out to see if they could come back out and see him for recent falls. Calling to see If you can place an order for pt- eval and treat.        Call back  number for lee is [phone]      Also informed them that last OV was in April and they will also advise the daughter he will need to be seen in office

## 2023-09-11 NOTE — TELEPHONE ENCOUNTER
Lyndsay Braswell that Dr. Ari Obrien is asking got the patient to come in to the office for follow up before order can be placed. Lyndsay Braswell that patient will need to call and make an appointment.

## 2023-09-13 ENCOUNTER — OFFICE VISIT (OUTPATIENT)
Dept: PRIMARY CARE CLINIC | Facility: CLINIC | Age: 83
End: 2023-09-13
Payer: MEDICARE

## 2023-09-13 VITALS
HEART RATE: 78 BPM | BODY MASS INDEX: 20.3 KG/M2 | WEIGHT: 145 LBS | DIASTOLIC BLOOD PRESSURE: 71 MMHG | OXYGEN SATURATION: 97 % | SYSTOLIC BLOOD PRESSURE: 124 MMHG | HEIGHT: 71 IN | RESPIRATION RATE: 18 BRPM

## 2023-09-13 DIAGNOSIS — I48.91 ATRIAL FIBRILLATION, UNSPECIFIED TYPE (HCC): ICD-10-CM

## 2023-09-13 DIAGNOSIS — R63.4 WEIGHT LOSS: ICD-10-CM

## 2023-09-13 DIAGNOSIS — R73.09 ELEVATED GLUCOSE: Primary | ICD-10-CM

## 2023-09-13 DIAGNOSIS — E78.00 PURE HYPERCHOLESTEROLEMIA, UNSPECIFIED: ICD-10-CM

## 2023-09-13 DIAGNOSIS — I10 ESSENTIAL (PRIMARY) HYPERTENSION: ICD-10-CM

## 2023-09-13 DIAGNOSIS — R29.6 RECURRENT FALLS: ICD-10-CM

## 2023-09-13 DIAGNOSIS — D68.9 COAGULATION DEFECT (HCC): ICD-10-CM

## 2023-09-13 LAB — HBA1C MFR BLD: 5.1 %

## 2023-09-13 PROCEDURE — 83036 HEMOGLOBIN GLYCOSYLATED A1C: CPT | Performed by: INTERNAL MEDICINE

## 2023-09-13 PROCEDURE — 3078F DIAST BP <80 MM HG: CPT | Performed by: INTERNAL MEDICINE

## 2023-09-13 PROCEDURE — G8427 DOCREV CUR MEDS BY ELIG CLIN: HCPCS | Performed by: INTERNAL MEDICINE

## 2023-09-13 PROCEDURE — 1036F TOBACCO NON-USER: CPT | Performed by: INTERNAL MEDICINE

## 2023-09-13 PROCEDURE — 3074F SYST BP LT 130 MM HG: CPT | Performed by: INTERNAL MEDICINE

## 2023-09-13 PROCEDURE — 1123F ACP DISCUSS/DSCN MKR DOCD: CPT | Performed by: INTERNAL MEDICINE

## 2023-09-13 PROCEDURE — 99214 OFFICE O/P EST MOD 30 MIN: CPT | Performed by: INTERNAL MEDICINE

## 2023-09-13 PROCEDURE — G8420 CALC BMI NORM PARAMETERS: HCPCS | Performed by: INTERNAL MEDICINE

## 2023-09-13 SDOH — ECONOMIC STABILITY: FOOD INSECURITY: WITHIN THE PAST 12 MONTHS, YOU WORRIED THAT YOUR FOOD WOULD RUN OUT BEFORE YOU GOT MONEY TO BUY MORE.: NEVER TRUE

## 2023-09-13 SDOH — ECONOMIC STABILITY: FOOD INSECURITY: WITHIN THE PAST 12 MONTHS, THE FOOD YOU BOUGHT JUST DIDN'T LAST AND YOU DIDN'T HAVE MONEY TO GET MORE.: NEVER TRUE

## 2023-09-13 SDOH — ECONOMIC STABILITY: HOUSING INSECURITY
IN THE LAST 12 MONTHS, WAS THERE A TIME WHEN YOU DID NOT HAVE A STEADY PLACE TO SLEEP OR SLEPT IN A SHELTER (INCLUDING NOW)?: NO

## 2023-09-13 SDOH — ECONOMIC STABILITY: INCOME INSECURITY: HOW HARD IS IT FOR YOU TO PAY FOR THE VERY BASICS LIKE FOOD, HOUSING, MEDICAL CARE, AND HEATING?: NOT HARD AT ALL

## 2023-09-13 NOTE — PROGRESS NOTES
Jaylin Fields is a 82 y.o.  male and presents with     Chief Complaint   Patient presents with    Follow-up     10 times this year to the ER - due to falls, gallbladder, gallstones, July gallbladder removal. Daughter wondering if he should be on antacids. Patient is losing weight. Daughter wondering with cardiologist if patient should continue blood thinners. Other     Daughter requesting approval for physical therapy     Pt is accompanied by daughter , pt has had multiple falls . Pt had gallbladder issues - has gallbladder removed. Pt lives at [facility] but has an aid as extra help  Pt has been found on the floor. Pt is on xarelto. Daughter talked to cardiologist regarding falls - was told he is at risk gor stroke due to Afib  Pt has lost about 40- 50 pounds. Forgetfull and cant remember to eat. Daughter says he has been eating but still loosing wt.   NO choking or aspiration        Past Medical History:   Diagnosis Date    Alzheimer disease ([address])     Atrial fibrillation ([address])     on Xarelto    Dementia ([address])     DAUGHTER STATES VASCULAR DEMENTIA, POSSIBLE MINI STROKE, BUT NEVER GOT CONFIRMATION OF THAT, LEWY BODY DEMENTIA WITH BEHAVIOR DISORDER    Hyperlipidemia     Hypertension     Insomnia     DAR (obstructive sleep apnea)     NOT CURRENTLY ABLE TO BE ON CPAP    Restless leg syndrome      Past Surgical History:   Procedure Laterality Date    CHOLECYSTECTOMY, LAPAROSCOPIC N/A 7/26/2023    LAPAROSCOPIC CHOLECYSTECTOMY performed by Kristian Gilmore MD at [facility] MAIN OR     Current Outpatient Medications   Medication Sig    vitamin D (ERGOCALCIFEROL) 1.25 MG (31541 UT) CAPS capsule TAKE 1 CAPSULE BY MOUTH EVERY 7 DAYS    QUEtiapine (SEROQUEL) 25 MG tablet Take 0.5 tablets by mouth 2 times daily    Probiotic Product (PROBIOTIC-10) CHEW Take 2 capsules by mouth daily    NONFORMULARY Take 3 tablets by mouth at bedtime HYLANDS LEG CRAMPS PM    Saw Lincoln 450-15 MG CAPS Take 1 capsule by mouth daily

## 2023-09-15 ENCOUNTER — TELEPHONE (OUTPATIENT)
Dept: PRIMARY CARE CLINIC | Facility: CLINIC | Age: 83
End: 2023-09-15

## 2023-09-15 NOTE — TELEPHONE ENCOUNTER
----- Message from Kristine Nicola sent at 9/15/2023 11:09 AM EDT -----  Subject: Message to Provider    QUESTIONS  Information for Provider? Eleanor Banuelos call from [address] called   in regards to a referral they received. She was wondering if they could   get the appt notes from 9/13. Please call her back. Fax number [phone]  ---------------------------------------------------------------------------  --------------  [address] [place]  [phone]; OK to leave message on voicemail,Do not leave any message,   patient will call back for answer  ---------------------------------------------------------------------------  --------------  SCRIPT ANSWERS  Relationship to Patient? Covered Entity  Covered Entity Type? Physical Therapy Office? Representative Name?  Eleanor Banuelos

## 2023-09-29 ENCOUNTER — TELEPHONE (OUTPATIENT)
Dept: PRIMARY CARE CLINIC | Facility: CLINIC | Age: 83
End: 2023-09-29

## 2023-09-29 NOTE — TELEPHONE ENCOUNTER
Patients daughter asked if Dr. Radha Queen can update the lidocaine patch prescription to as needed and to prescribe metamucil due to the patient having very bad constipation.  She asked send to [facility]  Fax [phone]

## 2023-09-30 DIAGNOSIS — K59.00 CONSTIPATION, UNSPECIFIED CONSTIPATION TYPE: ICD-10-CM

## 2023-09-30 DIAGNOSIS — G89.29 CHRONIC LOW BACK PAIN, UNSPECIFIED BACK PAIN LATERALITY, UNSPECIFIED WHETHER SCIATICA PRESENT: Primary | ICD-10-CM

## 2023-09-30 DIAGNOSIS — M54.50 CHRONIC LOW BACK PAIN, UNSPECIFIED BACK PAIN LATERALITY, UNSPECIFIED WHETHER SCIATICA PRESENT: Primary | ICD-10-CM

## 2023-09-30 RX ORDER — PSYLLIUM SEED (WITH DEXTROSE)
3.4 POWDER (GRAM) ORAL DAILY
Qty: 575 G | Refills: 3 | Status: SHIPPED | OUTPATIENT
Start: 2023-09-30 | End: 2023-10-02 | Stop reason: SDUPTHER

## 2023-09-30 RX ORDER — LIDOCAINE 4 G/G
1 PATCH TOPICAL DAILY
Qty: 30 PATCH | Refills: 3 | Status: SHIPPED | OUTPATIENT
Start: 2023-09-30 | End: 2023-10-02 | Stop reason: SDUPTHER

## 2023-10-02 ENCOUNTER — TELEPHONE (OUTPATIENT)
Dept: PRIMARY CARE CLINIC | Facility: CLINIC | Age: 83
End: 2023-10-02

## 2023-10-02 DIAGNOSIS — G89.29 CHRONIC LOW BACK PAIN, UNSPECIFIED BACK PAIN LATERALITY, UNSPECIFIED WHETHER SCIATICA PRESENT: ICD-10-CM

## 2023-10-02 DIAGNOSIS — M54.50 CHRONIC LOW BACK PAIN, UNSPECIFIED BACK PAIN LATERALITY, UNSPECIFIED WHETHER SCIATICA PRESENT: ICD-10-CM

## 2023-10-02 DIAGNOSIS — K59.00 CONSTIPATION, UNSPECIFIED CONSTIPATION TYPE: ICD-10-CM

## 2023-10-02 RX ORDER — LIDOCAINE 4 G/G
1 PATCH TOPICAL DAILY
Qty: 30 PATCH | Refills: 5 | Status: SHIPPED | OUTPATIENT
Start: 2023-10-02

## 2023-10-02 RX ORDER — PSYLLIUM SEED (WITH DEXTROSE)
3.4 POWDER (GRAM) ORAL DAILY
Qty: 575 G | Refills: 5 | Status: SHIPPED | OUTPATIENT
Start: 2023-10-02

## 2023-10-02 NOTE — TELEPHONE ENCOUNTER
Spoke with patients daughter. She stated that the lidocaine patches and metamucil need to be written prescriptions so I can fax over to [employer] at [facility] [employer] [facility]. I updated both scripts to \"as needed\". Please write 2 scripts for medications listed and I can fax over.

## 2023-10-02 NOTE — TELEPHONE ENCOUNTER
Called patients daughter back with an update on faxing scripts. They have been sent over and I received fax confirmation back. Left her a message stating that it has been done.

## 2023-10-02 NOTE — TELEPHONE ENCOUNTER
Patient's daughter called in reference to sending a prescription for Lidocaine patches and Metamucil As Needed to the facility that her father is in which is [facility] in [facility]#[phone]    **Per the request of the daughter Please remove [address] [place] [address] for as the [facility] in [facility] needs the prescriptions directly and on a as needed basis. **    **Please call daughter back to let her know that this has been taken care of at #[phone].

## 2023-10-05 ENCOUNTER — TELEPHONE (OUTPATIENT)
Dept: PRIMARY CARE CLINIC | Facility: CLINIC | Age: 83
End: 2023-10-05

## 2023-10-05 NOTE — TELEPHONE ENCOUNTER
Zoraida Graft for PT called back. Verbal provided.  Plans to send a form for Dr DESIR to sign to continue PT

## 2023-10-05 NOTE — TELEPHONE ENCOUNTER
Sarai Salmon the patient physical therapist is calling to request 6 more weeks for the patient. He said the family is requesting it.     Sarai Salmon: [phone]

## 2023-10-09 ENCOUNTER — TELEPHONE (OUTPATIENT)
Dept: PRIMARY CARE CLINIC | Facility: CLINIC | Age: 83
End: 2023-10-09

## 2023-10-09 NOTE — TELEPHONE ENCOUNTER
Called Jolly Cuellar from [address], he did not answer and I left a voicemail to call our office back

## 2023-10-09 NOTE — TELEPHONE ENCOUNTER
----- Message from Tyrel Reeves sent at 10/9/2023 12:21 PM EDT -----  Subject: Message to Provider    QUESTIONS  Information for Provider? Daughter just wanted to called and notify that hemant Green with the Facility [facility] will be dropping by office   tomorrow to drop off paperwork and request in regards to the patient. Also   to note that nothing else is to be sent over to [facility]. Any questions reach.   ---------------------------------------------------------------------------  --------------  Malathi Chase INFO  [phone]; OK to leave message on voicemail  ---------------------------------------------------------------------------  --------------  SCRIPT ANSWERS  Relationship to Patient? Other/Third Party  Representative Name? Obed Church  Is the representative on the Communication Release of Information (KELSY)   form in Epic?  Yes

## 2023-10-13 ENCOUNTER — TELEPHONE (OUTPATIENT)
Dept: PRIMARY CARE CLINIC | Facility: CLINIC | Age: 83
End: 2023-10-13

## 2023-10-13 NOTE — TELEPHONE ENCOUNTER
Rama Tucson with [facility] is calling to talk to Susy Dwainedarleen. She is checking on paperwork for the patient that she sent over a couple of days ago. They are trying to get him in the facility and they are waiting on the paperwork.       Rama Tucson [phone]

## 2023-10-23 ENCOUNTER — TELEPHONE (OUTPATIENT)
Dept: PRIMARY CARE CLINIC | Facility: CLINIC | Age: 83
End: 2023-10-23

## 2023-10-23 NOTE — TELEPHONE ENCOUNTER
Re-faxed patient medication list to [phone] and medication list was sign again by Provider. Lucy Nelson

## 2023-11-20 DIAGNOSIS — E55.9 VITAMIN D DEFICIENCY, UNSPECIFIED: ICD-10-CM

## 2023-11-20 NOTE — TELEPHONE ENCOUNTER
PCP: El Granados MD    Last Visit 9/13/2023   Future Appointments   Date Time Provider [address]   12/21/2023  2:15 PM El Granados MD [facility]       Requested Prescriptions     Pending Prescriptions Disp Refills    vitamin D (ERGOCALCIFEROL) 1.25 MG (21552 UT) CAPS capsule [Pharmacy Med Name: VITAMIN D2 1.25MG(50,000 UNIT)] 5 capsule 0     Sig: TAKE ONE CAPSULE BY MOUTH ONCE WEEKLY ON SATURDAYS FOR SUPPLEMENT.     QUEtiapine (SEROQUEL XR) 50 MG extended release tablet [Pharmacy Med Name: QUETIAPINE ER 50 MG TABLET] 31 tablet 0     Sig: TAKE 1 TABLET BY MOUTH AT BEDTIME FOR MOOD/SLEEP    atorvastatin (LIPITOR) 10 MG tablet [Pharmacy Med Name: ATORVASTATIN 10 MG TABLET] 31 tablet 0     Sig: TAKE 1 TABLET BY MOUTH EVERY DAY AT 5PM FOR CHOLESTEROL    XARELTO 20 MG TABS tablet [Pharmacy Med Name: Homer Laos 20 MG TABLET] 31 tablet 0     Sig: TAKE 1 TABLET BY MOUTH EVERY DAY WITH DINNER FOR AFIB         Other Comments: Last Refill         Other Comments: Last Refill

## 2023-11-26 RX ORDER — QUETIAPINE FUMARATE 50 MG/1
TABLET, EXTENDED RELEASE ORAL
Qty: 90 TABLET | Refills: 0 | Status: SHIPPED | OUTPATIENT
Start: 2023-11-26

## 2023-11-26 RX ORDER — ERGOCALCIFEROL 1.25 MG/1
CAPSULE ORAL
Qty: 12 CAPSULE | Refills: 0 | Status: SHIPPED | OUTPATIENT
Start: 2023-11-26

## 2023-11-26 RX ORDER — RIVAROXABAN 20 MG/1
TABLET, FILM COATED ORAL
Qty: 90 TABLET | Refills: 0 | Status: SHIPPED | OUTPATIENT
Start: 2023-11-26

## 2023-11-26 RX ORDER — ATORVASTATIN CALCIUM 10 MG/1
TABLET, FILM COATED ORAL
Qty: 90 TABLET | Refills: 0 | Status: SHIPPED | OUTPATIENT
Start: 2023-11-26

## 2023-12-04 RX ORDER — DONEPEZIL HYDROCHLORIDE 10 MG/1
TABLET, FILM COATED ORAL
Qty: 90 TABLET | Refills: 0 | Status: SHIPPED | OUTPATIENT
Start: 2023-12-04

## 2024-01-03 ENCOUNTER — TELEPHONE (OUTPATIENT)
Dept: PRIMARY CARE CLINIC | Facility: CLINIC | Age: 84
End: 2024-01-03

## 2024-01-03 NOTE — TELEPHONE ENCOUNTER
Mita from [facility] called back. They received all our faxes except for the order for hospice services. Mita said we can write a simple letter stating that the patient should be seen by hospice care.    Phone: [phone]   Fax: [phone]

## 2024-01-03 NOTE — TELEPHONE ENCOUNTER
Mita Last of [facility] ([phone]) called and requested the following information:   Hospice evaluation order   Visit notes for 6 months (June - Present)  Weights for 6 months (June - Present)  Demographics sheet    Fax #: [phone]    Please assist with this matter.    VLT - PSR

## 2024-01-03 NOTE — TELEPHONE ENCOUNTER
Pt's daughter (Yue - [phone]) advised to expect a call from [facility] (Mita Berhane) regarding her father receiving services from them. She advised that he needs an order from Dr. Case. She wants to keep Dr. Case to follow her father through hospice and would like to make sure that he is still willing to do that.     Please assist with this matter.    ONELT - PSR

## 2024-01-03 NOTE — TELEPHONE ENCOUNTER
Faxed over notes, demos to mino noriega at [facility].      Waiting fr response from the provider if he will still follow patient for this

## 2024-01-04 ENCOUNTER — TELEPHONE (OUTPATIENT)
Dept: PRIMARY CARE CLINIC | Facility: CLINIC | Age: 84
End: 2024-01-04

## 2024-01-04 NOTE — TELEPHONE ENCOUNTER
Mita Last and Patients daughter Yue are both aware that Dr. Case will continue to see the patient. But all orders related to patients hospice care should be the responsibility of hospice medical director.     Both individuals understood clearly.

## 2024-01-04 NOTE — TELEPHONE ENCOUNTER
Tried calling Mita Last , no answer and left a VM to call the office back to discuss patients hospice clinical information

## 2024-01-14 ENCOUNTER — HOSPITAL ENCOUNTER (EMERGENCY)
Facility: HOSPITAL | Age: 84
Discharge: HOME | End: 2024-01-14
Attending: EMERGENCY MEDICINE
Payer: MEDICARE

## 2024-01-14 ENCOUNTER — APPOINTMENT (OUTPATIENT)
Facility: HOSPITAL | Age: 84
End: 2024-01-14
Payer: MEDICARE

## 2024-01-14 VITALS
HEART RATE: 91 BPM | DIASTOLIC BLOOD PRESSURE: 68 MMHG | TEMPERATURE: 98.2 F | SYSTOLIC BLOOD PRESSURE: 157 MMHG | BODY MASS INDEX: 21.84 KG/M2 | WEIGHT: 152.56 LBS | OXYGEN SATURATION: 99 % | RESPIRATION RATE: 18 BRPM | HEIGHT: 70 IN

## 2024-01-14 DIAGNOSIS — R11.2 NAUSEA AND VOMITING, UNSPECIFIED VOMITING TYPE: ICD-10-CM

## 2024-01-14 DIAGNOSIS — R10.84 GENERALIZED ABDOMINAL PAIN: Primary | ICD-10-CM

## 2024-01-14 LAB
ALBUMIN SERPL-MCNC: 3.6 G/DL (ref 3.5–5)
ALBUMIN/GLOB SERPL: 1 (ref 1.1–2.2)
ALP SERPL-CCNC: 229 U/L (ref 45–117)
ALT SERPL-CCNC: 43 U/L (ref 12–78)
ANION GAP SERPL CALC-SCNC: 8 MMOL/L (ref 5–15)
AST SERPL-CCNC: 37 U/L (ref 15–37)
BASOPHILS # BLD: 0 K/UL (ref 0–0.1)
BASOPHILS NFR BLD: 0 % (ref 0–1)
BILIRUB SERPL-MCNC: 0.6 MG/DL (ref 0.2–1)
BUN SERPL-MCNC: 17 MG/DL (ref 6–20)
BUN/CREAT SERPL: 25 (ref 12–20)
CALCIUM SERPL-MCNC: 9 MG/DL (ref 8.5–10.1)
CHLORIDE SERPL-SCNC: 107 MMOL/L (ref 97–108)
CO2 SERPL-SCNC: 31 MMOL/L (ref 21–32)
CREAT SERPL-MCNC: 0.68 MG/DL (ref 0.7–1.3)
DIFFERENTIAL METHOD BLD: ABNORMAL
EOSINOPHIL # BLD: 0.1 K/UL (ref 0–0.4)
EOSINOPHIL NFR BLD: 1 % (ref 0–7)
ERYTHROCYTE [DISTWIDTH] IN BLOOD BY AUTOMATED COUNT: 12.6 % (ref 11.5–14.5)
GLOBULIN SER CALC-MCNC: 3.7 G/DL (ref 2–4)
GLUCOSE SERPL-MCNC: 128 MG/DL (ref 65–100)
HCT VFR BLD AUTO: 42.5 % (ref 36.6–50.3)
HGB BLD-MCNC: 13.9 G/DL (ref 12.1–17)
IMM GRANULOCYTES # BLD AUTO: 0 K/UL (ref 0–0.04)
IMM GRANULOCYTES NFR BLD AUTO: 0 % (ref 0–0.5)
LIPASE SERPL-CCNC: 273 U/L (ref 13–75)
LYMPHOCYTES # BLD: 1.2 K/UL (ref 0.8–3.5)
LYMPHOCYTES NFR BLD: 11 % (ref 12–49)
MCH RBC QN AUTO: 30.5 PG (ref 26–34)
MCHC RBC AUTO-ENTMCNC: 32.7 G/DL (ref 30–36.5)
MCV RBC AUTO: 93.2 FL (ref 80–99)
MONOCYTES # BLD: 0.7 K/UL (ref 0–1)
MONOCYTES NFR BLD: 7 % (ref 5–13)
NEUTS SEG # BLD: 8.6 K/UL (ref 1.8–8)
NEUTS SEG NFR BLD: 80 % (ref 32–75)
NRBC # BLD: 0 K/UL (ref 0–0.01)
NRBC BLD-RTO: 0 PER 100 WBC
PLATELET # BLD AUTO: 139 K/UL (ref 150–400)
PMV BLD AUTO: 11.3 FL (ref 8.9–12.9)
POTASSIUM SERPL-SCNC: 4.3 MMOL/L (ref 3.5–5.1)
PROT SERPL-MCNC: 7.3 G/DL (ref 6.4–8.2)
RBC # BLD AUTO: 4.56 M/UL (ref 4.1–5.7)
SODIUM SERPL-SCNC: 146 MMOL/L (ref 136–145)
WBC # BLD AUTO: 10.8 K/UL (ref 4.1–11.1)

## 2024-01-14 PROCEDURE — 6360000004 HC RX CONTRAST MEDICATION: Performed by: EMERGENCY MEDICINE

## 2024-01-14 PROCEDURE — 2580000003 HC RX 258: Performed by: EMERGENCY MEDICINE

## 2024-01-14 PROCEDURE — 99285 EMERGENCY DEPT VISIT HI MDM: CPT

## 2024-01-14 PROCEDURE — 36415 COLL VENOUS BLD VENIPUNCTURE: CPT

## 2024-01-14 PROCEDURE — 74177 CT ABD & PELVIS W/CONTRAST: CPT

## 2024-01-14 PROCEDURE — 83690 ASSAY OF LIPASE: CPT

## 2024-01-14 PROCEDURE — 6360000002 HC RX W HCPCS: Performed by: EMERGENCY MEDICINE

## 2024-01-14 PROCEDURE — 96374 THER/PROPH/DIAG INJ IV PUSH: CPT

## 2024-01-14 PROCEDURE — 85025 COMPLETE CBC W/AUTO DIFF WBC: CPT

## 2024-01-14 PROCEDURE — 96361 HYDRATE IV INFUSION ADD-ON: CPT

## 2024-01-14 PROCEDURE — 6370000000 HC RX 637 (ALT 250 FOR IP): Performed by: EMERGENCY MEDICINE

## 2024-01-14 PROCEDURE — 80053 COMPREHEN METABOLIC PANEL: CPT

## 2024-01-14 RX ORDER — ONDANSETRON 4 MG/1
4 TABLET, ORALLY DISINTEGRATING ORAL 3 TIMES DAILY PRN
Qty: 21 TABLET | Refills: 0 | Status: SHIPPED | OUTPATIENT
Start: 2024-01-14

## 2024-01-14 RX ORDER — 0.9 % SODIUM CHLORIDE 0.9 %
1000 INTRAVENOUS SOLUTION INTRAVENOUS ONCE
Status: COMPLETED | OUTPATIENT
Start: 2024-01-14 | End: 2024-01-14

## 2024-01-14 RX ORDER — ONDANSETRON 2 MG/ML
4 INJECTION INTRAMUSCULAR; INTRAVENOUS ONCE
Status: COMPLETED | OUTPATIENT
Start: 2024-01-14 | End: 2024-01-14

## 2024-01-14 RX ORDER — ACETAMINOPHEN 500 MG
1000 TABLET ORAL
Status: COMPLETED | OUTPATIENT
Start: 2024-01-14 | End: 2024-01-14

## 2024-01-14 RX ORDER — MAGNESIUM CARB/ALUMINUM HYDROX 105-160MG
296 TABLET,CHEWABLE ORAL ONCE
Qty: 296 ML | Refills: 0 | Status: SHIPPED | OUTPATIENT
Start: 2024-01-14 | End: 2024-01-14

## 2024-01-14 RX ADMIN — ONDANSETRON 4 MG: 2 INJECTION INTRAMUSCULAR; INTRAVENOUS at 08:03

## 2024-01-14 RX ADMIN — ACETAMINOPHEN 1000 MG: 500 TABLET ORAL at 09:45

## 2024-01-14 RX ADMIN — SODIUM CHLORIDE 1000 ML: 9 INJECTION, SOLUTION INTRAVENOUS at 08:03

## 2024-01-14 RX ADMIN — IOPAMIDOL 100 ML: 755 INJECTION, SOLUTION INTRAVENOUS at 09:16

## 2024-01-14 ASSESSMENT — PAIN DESCRIPTION - LOCATION: LOCATION: ABDOMEN

## 2024-01-14 ASSESSMENT — PAIN - FUNCTIONAL ASSESSMENT
PAIN_FUNCTIONAL_ASSESSMENT: WONG-BAKER FACES
PAIN_FUNCTIONAL_ASSESSMENT: ACTIVITIES ARE NOT PREVENTED

## 2024-01-14 ASSESSMENT — PAIN SCALES - WONG BAKER: WONGBAKER_NUMERICALRESPONSE: 2

## 2024-01-14 ASSESSMENT — PAIN DESCRIPTION - PAIN TYPE: TYPE: ACUTE PAIN

## 2024-01-14 ASSESSMENT — PAIN DESCRIPTION - DESCRIPTORS: DESCRIPTORS: ACHING

## 2024-01-14 ASSESSMENT — PAIN DESCRIPTION - ORIENTATION: ORIENTATION: ANTERIOR

## 2024-01-14 ASSESSMENT — PAIN DESCRIPTION - ONSET: ONSET: ON-GOING

## 2024-01-14 NOTE — ED NOTES
Pt resting comfortably, pt urinated in brief and pt was change, tylenol given. Daughter at bedside.

## 2024-01-14 NOTE — ED NOTES
Pt d/c'd home. IV d/c'd cath intact. Family given d/c instructions and rx x2. Family verbalized understanding. Declined w/c and pt and family member left ambulatory in stable condition

## 2024-01-14 NOTE — ED PROVIDER NOTES
warm and dry.   Neurological:      General: No focal deficit present.      Mental Status: He is alert and oriented to person, place, and time.   Psychiatric:         Mood and Affect: Mood normal.         DIAGNOSTIC RESULTS     EKG: All EKG's are interpreted by the Emergency Department Physician who either signs or Co-signs this chart in the absence of a cardiologist.        RADIOLOGY:   Non-plain film images such as CT, Ultrasound and MRI are read by the radiologist. Plain radiographic images are visualized and preliminarily interpreted by the emergency physician with the below findings:        Interpretation per the Radiologist below, if available at the time of this note:    CT ABDOMEN PELVIS W IV CONTRAST Additional Contrast? None    (Results Pending)        LABS:  Labs Reviewed   CBC WITH AUTO DIFFERENTIAL   COMPREHENSIVE METABOLIC PANEL   LIPASE       All other labs were within normal range or not returned as of this dictation.    EMERGENCY DEPARTMENT COURSE and DIFFERENTIAL DIAGNOSIS/MDM:   Vitals:    Vitals:    01/14/24 0719   BP: (!) 174/85   Pulse: 91   Resp: 18   Temp: 98.2 °F (36.8 °C)   TempSrc: Oral   SpO2: 98%   Weight: 69.2 kg (152 lb 8.9 oz)   Height: 1.778 m (5' 10\")           Medical Decision Making  DDx: food poisoning, viral gastroenteritis, gastritis, appendicitis, cholecystitis, diverticulitis, colitis, pancreatitis, SBO, ileus    Plan:  - Labs: CBC, CMP, lipase  - Imaging: CT abdomen pelvis  - Medications: ondansetron, acetaminophen    Reassessment: Patient's CT demonstrates moderate stool burden and multiple subcentimeter for lesions of unknown cause.  His lipase is elevated but there are no abnormal findings within the pancreas on CT and he is not tender over the epigastrium or left upper quadrant.  Suspect constipation as the primary etiology of his symptoms.  Counseled his daughter on managing this.  Will prescribe magnesium citrate and encouraged her to continue his stool softener.  Also

## 2024-01-14 NOTE — ED TRIAGE NOTES
[facility] Nursing Note        Patient Name: Sunil Flores      : 1940             MRN: [id number]      Chief Complaint:  Abdominal Pain, Nausea, and Emesis      Admit Diagnosis: No admission diagnoses are documented for this encounter.      Admitting Provider: No admitting provider for patient encounter.      Surgery: * No surgery found *           Patient arrived to the ER ambulatory from home with complaints of Abdominal Pain, Nausea, Vomiting x 3 that started yesterday after eating a lot of food at a gathering.         Lines:        Signed by: Tristan Bocanegra RN, TONIE, BSN, CMSRN                                              2024 at 7:21 AM

## 2024-01-14 NOTE — DISCHARGE INSTRUCTIONS
You were seen in the emergency department for nausea, vomiting, and constipation.  The results of your tests were consistent with constipation.  Please take any medications prescribed at this visit as instructed.  Please follow-up with your PCP or return to the emergency department if you experience a worsening of symptoms or any new symptoms that are concerning to you.

## (undated) DEVICE — SUTURE MCRYL SZ 4-0 L27IN ABSRB UD L19MM PS-2 1/2 CIR PRIM Y426H

## (undated) DEVICE — ELECTRODE ES 36CM LAP FLAT L HK COAT DISP CLEANCOAT

## (undated) DEVICE — TROCAR ENDOSCP L100MM DIA5MM BLDELSS STBL SL OBT RADLUC

## (undated) DEVICE — SUTURE PDS II SZ 0 L27IN ABSRB VLT L26MM CT-2 1/2 CIR Z334H

## (undated) DEVICE — C-ARM: Brand: UNBRANDED

## (undated) DEVICE — GLOVE SURG SZ 65 L12IN FNGR THK94MIL STD WHT LTX FREE

## (undated) DEVICE — 4-PORT MANIFOLD: Brand: NEPTUNE 2

## (undated) DEVICE — GLOVE SURG SZ 7 L12IN FNGR THK79MIL GRN LTX FREE

## (undated) DEVICE — TROCAR ENDOSCP L100MM DIA12MM BLDELSS OBT RADLUC STBL SL

## (undated) DEVICE — SCISSORS ENDOSCP DIA5MM CRV MPLR CAUT W/ RATCH HNDL

## (undated) DEVICE — NEEDLE SPNL 22GA L3.5IN BLK HUB S STL REG WALL FIT STYL W/

## (undated) DEVICE — COVER LT HNDL PLAS RIG 1 PER PK

## (undated) DEVICE — GENERAL LAPAROSCOPY - SMH: Brand: MEDLINE INDUSTRIES, INC.

## (undated) DEVICE — SYRINGE 20ML LL S/C 50

## (undated) DEVICE — BAG SPEC REM 224ML W4XL6IN DIA10MM 1 HND GYN DISP ENDOPCH

## (undated) DEVICE — TROCAR ENDOSCP L100MM DIA5MM BLDELSS STBL SL THRD OPT VW

## (undated) DEVICE — SYSTEM EVAC SMOKE LAPARSCOPIC

## (undated) DEVICE — SET CHOLANGIOGRAPHY 4FR L60CM W/ ARW KARLAN BLLN CATH CRV

## (undated) DEVICE — LIQUIBAND RAPID ADHESIVE 36/CS 0.8ML: Brand: MEDLINE

## (undated) DEVICE — LOOP LIG SUT SZ 0 L18IN ABSRB POLYDIOXANONE MFIL PDS II

## (undated) DEVICE — GOWN,ECLIPSE,FABRNF,XL,30/CS: Brand: MEDLINE

## (undated) DEVICE — APPLIER CLP M/L SHFT DIA5MM 15 LIG LIGAMAX 5

## (undated) DEVICE — GARMENT,MEDLINE,DVT,INT,CALF,MED, GEN2: Brand: MEDLINE

## (undated) DEVICE — SYRINGE MED 30ML STD CLR PLAS LUERLOCK TIP N CTRL DISP